# Patient Record
Sex: MALE | Race: WHITE | Employment: FULL TIME | URBAN - METROPOLITAN AREA
[De-identification: names, ages, dates, MRNs, and addresses within clinical notes are randomized per-mention and may not be internally consistent; named-entity substitution may affect disease eponyms.]

---

## 2018-09-02 ENCOUNTER — APPOINTMENT (OUTPATIENT)
Dept: CT IMAGING | Age: 28
End: 2018-09-02
Payer: COMMERCIAL

## 2018-09-02 ENCOUNTER — HOSPITAL ENCOUNTER (OUTPATIENT)
Age: 28
Setting detail: OBSERVATION
Discharge: HOME | End: 2018-09-05
Attending: EMERGENCY MEDICINE | Admitting: INTERNAL MEDICINE
Payer: COMMERCIAL

## 2018-09-02 DIAGNOSIS — R20.0 LEFT SIDED NUMBNESS: Primary | ICD-10-CM

## 2018-09-02 LAB
A/G RATIO: 1.5 (ref 1.1–2.2)
ALBUMIN SERPL-MCNC: 4.5 G/DL (ref 3.4–5)
ALP BLD-CCNC: 97 U/L (ref 40–129)
ALT SERPL-CCNC: 55 U/L (ref 10–40)
ANION GAP SERPL CALCULATED.3IONS-SCNC: 16 MMOL/L (ref 3–16)
AST SERPL-CCNC: 44 U/L (ref 15–37)
BASOPHILS ABSOLUTE: 0.1 K/UL (ref 0–0.2)
BASOPHILS RELATIVE PERCENT: 0.7 %
BILIRUB SERPL-MCNC: 0.3 MG/DL (ref 0–1)
BUN BLDV-MCNC: 14 MG/DL (ref 7–20)
CALCIUM SERPL-MCNC: 9.6 MG/DL (ref 8.3–10.6)
CHLORIDE BLD-SCNC: 101 MMOL/L (ref 99–110)
CO2: 23 MMOL/L (ref 21–32)
CREAT SERPL-MCNC: 0.8 MG/DL (ref 0.9–1.3)
EOSINOPHILS ABSOLUTE: 0.7 K/UL (ref 0–0.6)
EOSINOPHILS RELATIVE PERCENT: 4.9 %
GFR AFRICAN AMERICAN: >60
GFR NON-AFRICAN AMERICAN: >60
GLOBULIN: 3.1 G/DL
GLUCOSE BLD-MCNC: 108 MG/DL
GLUCOSE BLD-MCNC: 109 MG/DL (ref 70–99)
HCT VFR BLD CALC: 43.8 % (ref 40.5–52.5)
HEMOGLOBIN: 15.3 G/DL (ref 13.5–17.5)
LYMPHOCYTES ABSOLUTE: 3.4 K/UL (ref 1–5.1)
LYMPHOCYTES RELATIVE PERCENT: 24.1 %
MCH RBC QN AUTO: 30.5 PG (ref 26–34)
MCHC RBC AUTO-ENTMCNC: 34.8 G/DL (ref 31–36)
MCV RBC AUTO: 87.6 FL (ref 80–100)
MONOCYTES ABSOLUTE: 1.2 K/UL (ref 0–1.3)
MONOCYTES RELATIVE PERCENT: 8.4 %
NEUTROPHILS ABSOLUTE: 8.8 K/UL (ref 1.7–7.7)
NEUTROPHILS RELATIVE PERCENT: 61.9 %
PDW BLD-RTO: 13.5 % (ref 12.4–15.4)
PLATELET # BLD: 261 K/UL (ref 135–450)
PMV BLD AUTO: 8.2 FL (ref 5–10.5)
POTASSIUM REFLEX MAGNESIUM: 3.8 MMOL/L (ref 3.5–5.1)
RBC # BLD: 5 M/UL (ref 4.2–5.9)
SODIUM BLD-SCNC: 140 MMOL/L (ref 136–145)
TOTAL PROTEIN: 7.6 G/DL (ref 6.4–8.2)
WBC # BLD: 14.3 K/UL (ref 4–11)

## 2018-09-02 PROCEDURE — 99285 EMERGENCY DEPT VISIT HI MDM: CPT

## 2018-09-02 PROCEDURE — 93005 ELECTROCARDIOGRAM TRACING: CPT | Performed by: EMERGENCY MEDICINE

## 2018-09-02 PROCEDURE — 85025 COMPLETE CBC W/AUTO DIFF WBC: CPT

## 2018-09-02 PROCEDURE — 80053 COMPREHEN METABOLIC PANEL: CPT

## 2018-09-02 PROCEDURE — 70450 CT HEAD/BRAIN W/O DYE: CPT

## 2018-09-02 ASSESSMENT — PAIN SCALES - GENERAL: PAINLEVEL_OUTOF10: 4

## 2018-09-02 ASSESSMENT — PAIN DESCRIPTION - LOCATION: LOCATION: HEAD

## 2018-09-02 ASSESSMENT — PAIN DESCRIPTION - PAIN TYPE: TYPE: ACUTE PAIN

## 2018-09-03 ENCOUNTER — APPOINTMENT (OUTPATIENT)
Dept: MRI IMAGING | Age: 28
End: 2018-09-03
Payer: COMMERCIAL

## 2018-09-03 ENCOUNTER — APPOINTMENT (OUTPATIENT)
Dept: CT IMAGING | Age: 28
End: 2018-09-03
Payer: COMMERCIAL

## 2018-09-03 PROBLEM — R20.0 LEFT SIDED NUMBNESS: Status: ACTIVE | Noted: 2018-09-03

## 2018-09-03 LAB
AMPHETAMINE SCREEN, URINE: NORMAL
BARBITURATE SCREEN URINE: NORMAL
BENZODIAZEPINE SCREEN, URINE: NORMAL
BILIRUBIN URINE: NEGATIVE
BLOOD, URINE: NEGATIVE
CANNABINOID SCREEN URINE: NORMAL
CLARITY: CLEAR
COCAINE METABOLITE SCREEN URINE: NORMAL
COLOR: YELLOW
D DIMER: 303 NG/ML DDU (ref 0–229)
GLUCOSE URINE: NEGATIVE MG/DL
HCT VFR BLD CALC: 42.3 % (ref 40.5–52.5)
HEMOGLOBIN: 14.6 G/DL (ref 13.5–17.5)
KETONES, URINE: NEGATIVE MG/DL
LEUKOCYTE ESTERASE, URINE: NEGATIVE
Lab: NORMAL
MCH RBC QN AUTO: 30.2 PG (ref 26–34)
MCHC RBC AUTO-ENTMCNC: 34.5 G/DL (ref 31–36)
MCV RBC AUTO: 87.6 FL (ref 80–100)
METHADONE SCREEN, URINE: NORMAL
MICROSCOPIC EXAMINATION: NORMAL
NITRITE, URINE: NEGATIVE
OCCULT BLOOD DIAGNOSTIC: ABNORMAL
OPIATE SCREEN URINE: NORMAL
OXYCODONE URINE: NORMAL
PDW BLD-RTO: 13.5 % (ref 12.4–15.4)
PH UA: 7
PH UA: 7
PHENCYCLIDINE SCREEN URINE: NORMAL
PLATELET # BLD: 215 K/UL (ref 135–450)
PMV BLD AUTO: 8.1 FL (ref 5–10.5)
PROPOXYPHENE SCREEN: NORMAL
PROTEIN UA: NEGATIVE MG/DL
RBC # BLD: 4.83 M/UL (ref 4.2–5.9)
SEDIMENTATION RATE, ERYTHROCYTE: 7 MM/HR (ref 0–15)
SPECIFIC GRAVITY UA: 1.02
SPECIMEN STATUS: NORMAL
TROPONIN: <0.01 NG/ML
URINE TYPE: NORMAL
UROBILINOGEN, URINE: 0.2 E.U./DL
WBC # BLD: 9 K/UL (ref 4–11)

## 2018-09-03 PROCEDURE — 82746 ASSAY OF FOLIC ACID SERUM: CPT

## 2018-09-03 PROCEDURE — 99220 PR INITIAL OBSERVATION CARE/DAY 70 MINUTES: CPT | Performed by: PSYCHIATRY & NEUROLOGY

## 2018-09-03 PROCEDURE — 81240 F2 GENE: CPT

## 2018-09-03 PROCEDURE — 2580000003 HC RX 258: Performed by: INTERNAL MEDICINE

## 2018-09-03 PROCEDURE — 85379 FIBRIN DEGRADATION QUANT: CPT

## 2018-09-03 PROCEDURE — 96372 THER/PROPH/DIAG INJ SC/IM: CPT

## 2018-09-03 PROCEDURE — 85027 COMPLETE CBC AUTOMATED: CPT

## 2018-09-03 PROCEDURE — 93010 ELECTROCARDIOGRAM REPORT: CPT | Performed by: INTERNAL MEDICINE

## 2018-09-03 PROCEDURE — G0378 HOSPITAL OBSERVATION PER HR: HCPCS

## 2018-09-03 PROCEDURE — 80307 DRUG TEST PRSMV CHEM ANLYZR: CPT

## 2018-09-03 PROCEDURE — 86255 FLUORESCENT ANTIBODY SCREEN: CPT

## 2018-09-03 PROCEDURE — 85302 CLOT INHIBIT PROT C ANTIGEN: CPT

## 2018-09-03 PROCEDURE — G0328 FECAL BLOOD SCRN IMMUNOASSAY: HCPCS

## 2018-09-03 PROCEDURE — 96374 THER/PROPH/DIAG INJ IV PUSH: CPT

## 2018-09-03 PROCEDURE — 85652 RBC SED RATE AUTOMATED: CPT

## 2018-09-03 PROCEDURE — 83090 ASSAY OF HOMOCYSTEINE: CPT

## 2018-09-03 PROCEDURE — 70553 MRI BRAIN STEM W/O & W/DYE: CPT

## 2018-09-03 PROCEDURE — 6360000004 HC RX CONTRAST MEDICATION: Performed by: PSYCHIATRY & NEUROLOGY

## 2018-09-03 PROCEDURE — 85730 THROMBOPLASTIN TIME PARTIAL: CPT

## 2018-09-03 PROCEDURE — 6360000002 HC RX W HCPCS: Performed by: INTERNAL MEDICINE

## 2018-09-03 PROCEDURE — 83036 HEMOGLOBIN GLYCOSYLATED A1C: CPT

## 2018-09-03 PROCEDURE — 85305 CLOT INHIBIT PROT S TOTAL: CPT

## 2018-09-03 PROCEDURE — 85613 RUSSELL VIPER VENOM DILUTED: CPT

## 2018-09-03 PROCEDURE — 6370000000 HC RX 637 (ALT 250 FOR IP): Performed by: PSYCHIATRY & NEUROLOGY

## 2018-09-03 PROCEDURE — 86147 CARDIOLIPIN ANTIBODY EA IG: CPT

## 2018-09-03 PROCEDURE — 70496 CT ANGIOGRAPHY HEAD: CPT

## 2018-09-03 PROCEDURE — 81003 URINALYSIS AUTO W/O SCOPE: CPT

## 2018-09-03 PROCEDURE — 70498 CT ANGIOGRAPHY NECK: CPT

## 2018-09-03 PROCEDURE — 84484 ASSAY OF TROPONIN QUANT: CPT

## 2018-09-03 PROCEDURE — 6370000000 HC RX 637 (ALT 250 FOR IP): Performed by: INTERNAL MEDICINE

## 2018-09-03 PROCEDURE — 80061 LIPID PANEL: CPT

## 2018-09-03 PROCEDURE — 94761 N-INVAS EAR/PLS OXIMETRY MLT: CPT

## 2018-09-03 PROCEDURE — 36415 COLL VENOUS BLD VENIPUNCTURE: CPT

## 2018-09-03 PROCEDURE — A9579 GAD-BASE MR CONTRAST NOS,1ML: HCPCS | Performed by: INTERNAL MEDICINE

## 2018-09-03 PROCEDURE — 86038 ANTINUCLEAR ANTIBODIES: CPT

## 2018-09-03 PROCEDURE — 84443 ASSAY THYROID STIM HORMONE: CPT

## 2018-09-03 PROCEDURE — 82607 VITAMIN B-12: CPT

## 2018-09-03 PROCEDURE — 6360000004 HC RX CONTRAST MEDICATION: Performed by: INTERNAL MEDICINE

## 2018-09-03 PROCEDURE — 85610 PROTHROMBIN TIME: CPT

## 2018-09-03 RX ORDER — HYDRALAZINE HYDROCHLORIDE 10 MG/1
10 TABLET, FILM COATED ORAL EVERY 6 HOURS PRN
Status: DISCONTINUED | OUTPATIENT
Start: 2018-09-03 | End: 2018-09-04

## 2018-09-03 RX ORDER — ASPIRIN 81 MG/1
81 TABLET, CHEWABLE ORAL DAILY
Status: DISCONTINUED | OUTPATIENT
Start: 2018-09-03 | End: 2018-09-05 | Stop reason: HOSPADM

## 2018-09-03 RX ORDER — SODIUM CHLORIDE 0.9 % (FLUSH) 0.9 %
10 SYRINGE (ML) INJECTION PRN
Status: DISCONTINUED | OUTPATIENT
Start: 2018-09-03 | End: 2018-09-05 | Stop reason: HOSPADM

## 2018-09-03 RX ORDER — SODIUM CHLORIDE 0.9 % (FLUSH) 0.9 %
10 SYRINGE (ML) INJECTION EVERY 12 HOURS SCHEDULED
Status: DISCONTINUED | OUTPATIENT
Start: 2018-09-03 | End: 2018-09-05 | Stop reason: HOSPADM

## 2018-09-03 RX ORDER — ONDANSETRON 2 MG/ML
4 INJECTION INTRAMUSCULAR; INTRAVENOUS EVERY 6 HOURS PRN
Status: DISCONTINUED | OUTPATIENT
Start: 2018-09-03 | End: 2018-09-05 | Stop reason: HOSPADM

## 2018-09-03 RX ADMIN — ONDANSETRON 4 MG: 2 INJECTION INTRAMUSCULAR; INTRAVENOUS at 11:05

## 2018-09-03 RX ADMIN — HYDRALAZINE HYDROCHLORIDE 10 MG: 10 TABLET, FILM COATED ORAL at 08:02

## 2018-09-03 RX ADMIN — ENOXAPARIN SODIUM 40 MG: 40 INJECTION SUBCUTANEOUS at 08:03

## 2018-09-03 RX ADMIN — ASPIRIN 81 MG 81 MG: 81 TABLET ORAL at 14:43

## 2018-09-03 RX ADMIN — IOPAMIDOL 80 ML: 755 INJECTION, SOLUTION INTRAVENOUS at 14:12

## 2018-09-03 RX ADMIN — Medication 10 ML: at 19:52

## 2018-09-03 RX ADMIN — SODIUM CHLORIDE, PRESERVATIVE FREE 10 ML: 5 INJECTION INTRAVENOUS at 11:05

## 2018-09-03 RX ADMIN — HYDRALAZINE HYDROCHLORIDE 10 MG: 10 TABLET, FILM COATED ORAL at 23:41

## 2018-09-03 RX ADMIN — Medication 10 ML: at 08:03

## 2018-09-03 RX ADMIN — GADOTERIDOL 30 ML: 279.3 INJECTION, SOLUTION INTRAVENOUS at 10:49

## 2018-09-03 ASSESSMENT — PAIN SCALES - GENERAL: PAINLEVEL_OUTOF10: 0

## 2018-09-03 NOTE — ED PROVIDER NOTES
Allergies    REVIEW OF SYSTEMS  10 systems reviewed, pertinent positives per HPI otherwise noted to be negative. PHYSICAL EXAM  BP (!) 151/95   Pulse 88   Temp 98.4 °F (36.9 °C) (Oral)   Resp 19   Ht 6' 1\" (1.854 m)   Wt (!) 370 lb (167.8 kg)   SpO2 98%   BMI 48.82 kg/m²   GENERAL APPEARANCE: Awake and alert. Cooperative. No acute distress. HEAD: Normocephalic. Atraumatic. EYES: PERRL. EOM's grossly intact. ENT: Mucous membranes are moist.   NECK: Supple. HEART: RRR. No murmurs. LUNGS: Respirations unlabored. CTAB. Good air exchange. Speaking comfortably in full sentences. ABDOMEN: Soft. Non-distended. Non-tender. No masses. No organomegaly. No guarding or rebound. EXTREMITIES: No peripheral edema. Moves all extremities equally. SKIN: Warm and dry. No acute rashes. NEUROLOGICAL: Alert and oriented. Please see NIH Stroke Scale below. PSYCHIATRIC: Normal mood and affect. NIH Stroke Scale     Interval: Baseline  Time: 0015  Person Administering Scale: Marvin DAVIS MD   Administer stroke scale items in the order listed. Record performance in each category after each subscale exam. Do not go back and change scores. Follow directions provided for each exam technique. Scores should reflect what the patient does, not what the clinician thinks the patient can do. The clinician should record answers while administering the exam and work quickly. Except where indicated, the patient should not be coached (i.e., repeated requests to patient to make a special effort). 1a  Level of consciousness: 0=alert; keenly responsive   1b. LOC questions:  0=Performs both tasks correctly   1c. LOC commands: 0=Performs both tasks correctly   2. Best Gaze: 0=normal   3. Visual: 0=No visual loss   4. Facial Palsy: 0=Normal symmetric movement   5a. Motor left arm: 0=No drift, limb holds 90 (or 45) degrees for full 10 seconds   5b.   Motor right arm: 0=No drift, limb holds 90 (or 45) degrees for full 10 seconds   6a. motor left le=No drift, limb holds 90 (or 45) degrees for full 10 seconds   6b  Motor right le=No drift, limb holds 90 (or 45) degrees for full 10 seconds   7. Limb Ataxia: 0=Absent   8. Sensory: 1=Mild to moderate sensory loss; patient feels pinprick is less sharp or is dull on the affected side; there is a loss of superficial pain with pinprick but patient is aware He is being touched   9. Best Language:  0=No aphasia, normal   10. Dysarthria: 0=Normal   11. Extinction and Inattention: 0=No abnormality   12. Distal motor function: 0=Normal    Total:  1     Modified Rome Score - Assessing Disability From Stroke    Score: 1 - No significant disability despite symptoms; able to carry out all usual duties and activities    LABS  I have reviewed all labs for this visit.    Results for orders placed or performed during the hospital encounter of 18   CBC auto differential   Result Value Ref Range    WBC 14.3 (H) 4.0 - 11.0 K/uL    RBC 5.00 4.20 - 5.90 M/uL    Hemoglobin 15.3 13.5 - 17.5 g/dL    Hematocrit 43.8 40.5 - 52.5 %    MCV 87.6 80.0 - 100.0 fL    MCH 30.5 26.0 - 34.0 pg    MCHC 34.8 31.0 - 36.0 g/dL    RDW 13.5 12.4 - 15.4 %    Platelets 135 271 - 758 K/uL    MPV 8.2 5.0 - 10.5 fL    Neutrophils % 61.9 %    Lymphocytes % 24.1 %    Monocytes % 8.4 %    Eosinophils % 4.9 %    Basophils % 0.7 %    Neutrophils # 8.8 (H) 1.7 - 7.7 K/uL    Lymphocytes # 3.4 1.0 - 5.1 K/uL    Monocytes # 1.2 0.0 - 1.3 K/uL    Eosinophils # 0.7 (H) 0.0 - 0.6 K/uL    Basophils # 0.1 0.0 - 0.2 K/uL   Comprehensive Metabolic Panel w/ Reflex to MG   Result Value Ref Range    Sodium 140 136 - 145 mmol/L    Potassium reflex Magnesium 3.8 3.5 - 5.1 mmol/L    Chloride 101 99 - 110 mmol/L    CO2 23 21 - 32 mmol/L    Anion Gap 16 3 - 16    Glucose 109 (H) 70 - 99 mg/dL    BUN 14 7 - 20 mg/dL    CREATININE 0.8 (L) 0.9 - 1.3 mg/dL    GFR Non-African American >60 >60    GFR African American >60 >60    Calcium 9.6 8.3 - 10.6 mg/dL    Total Protein 7.6 6.4 - 8.2 g/dL    Alb 4.5 3.4 - 5.0 g/dL    Albumin/Globulin Ratio 1.5 1.1 - 2.2    Total Bilirubin 0.3 0.0 - 1.0 mg/dL    Alkaline Phosphatase 97 40 - 129 U/L    ALT 55 (H) 10 - 40 U/L    AST 44 (H) 15 - 37 U/L    Globulin 3.1 g/dL   Sample possible blood bank testing   Result Value Ref Range    Specimen Status VALERIE    POCT glucose   Result Value Ref Range    Glucose 108 mg/dL       EKG Interpretation    Interpreted by emergency department physician    My interpretation: Normal sinus rhythm rate 94, left axis deviation, normal intervals, no ST elevations or depressions      RADIOLOGY    CT HEAD WO CONTRAST   Final Result   No acute intracranial abnormality. Paranasal sinus disease, greatest of the right ethmoid sinus. ED COURSE/MDM  Patient seen and evaluated. Old records reviewed. Labs and imaging reviewed and results discussed. Patient presented to the emergency department with left-sided facial numbness and arm and leg numbness. Patient has a stroke scale of 1. Patient is not experiencing a disabling stroke. Patient does not warrant TPA given the symptoms and low likelihood of associated long-term disability. Patient will need admission for MRI as well as further stroke risk factor reduction. Hospitalist service was contacted and accepted the patient for admission and continued management. We thoroughly discussed the history, physical exam, laboratory and imaging studies, as well as, emergency department course. Based upon that discussion, we've decided to admit Sintia Isaacs for further observation and evaluation of Alecia Hassan's CVA-like symptoms. As I have deemed necessary from their history, physical and studies, I have considered and evaluated Sintia Isaacs for the following diagnoses:  DIABETES, INTRACRANIAL HEMORRHAGE, MENINGITIS, SUBARACHNOID HEMORRHAGE, SUBDURAL HEMATOMA, & STROKE.     t-PA NOT given due to the following EXCLUSION CRITERIA (only those checked):  [] Pregnancy  [] Symptoms > 3 hours of onset  [x] Minor or isolated neurological signs  [] Rapid improvement of stroke symptoms  [] Seizure at the same time of stroke symptoms  [] Active bleeding or acute trauma (fracture)  [] Presentation consistent with acute MI or post-MI pericarditis  [] Known intracranial neoplasm, AV malformation or aneurysm  [] CT evidence of intracranial hemorrhage  [] Any prior history of intracranial hemorrhage  [] Symptoms suggestive of subarachnoid hemorrhage (even if head CT normal)  [] Persistent hypertension (SBP>185 or DBP>110)  [] Glucose < 50 or > 400. [] Bleeding diathesis, including but not limited to:   -Platelets < 117,297   -Heparin within 48 hours with PTT > normal range   -Current or recent use of anticoagulants (dabagtran, rivaroxaban, or warfarin with     INR > 1.7)  [] Lumbar puncture in past 7 days  [] Arterial puncture at a noncompressible site in past 7 days  [] Major surgery in past 14 days  [] Gastrointestinal or urinary tract hemorrhage in past 21 days  [] Myocardial infarction in past 3 months  [] Stroke, intracranial surgery or serious head trauma in past 3 months    t-PA given with the following INCLUSION CRITERIA verified (only those checked):  [] Age 25 years or older  [] Clinical diagnosis of ischemic stroke causing measurable neurological deficit  [] Administration of t-PA can be initiated within 3 hours of onset of symptoms  [] A patient or family members who understand the potential risks and benefits:   Of every 100 patients treated with tPA:   72 will have the same outcome   32 will have a better outcome   3 will have a worse outcome (with 1 being severely disabled or fatal) due to t-PA        CLINICAL IMPRESSION  1. Left sided numbness        Blood pressure (!) 151/95, pulse 88, temperature 98.4 °F (36.9 °C), temperature source Oral, resp.  rate 19, height 6' 1\" (1.854 m), weight (!) 370 lb (167.8 kg), SpO2 98

## 2018-09-03 NOTE — CONSULTS
In patient Neurology consult        [facility] Neurology      MD Sylvia Posada  1990    Date of Service: 9/3/2018    Referring Physician: Annemarie Gee MD      Reason for the consult: Left sided numbness. Possible stroke. HPI:   The patient is a 34y.o.  years old male without significant past medical history who was admitted to [facility] last night with acute left-sided numbness and tingling. Symptoms started yesterday afternoon. He describes sudden onset of left face, arm and leg numbness and tingling. No triggers or other associated symptoms. Degree was severe. Duration was waxing and waning but persistent. No double vision or blurred vision, dysphagia or dysarthria or falling. Patient had a similar episode few days ago and was seen at outside ED. Initial workup in the ED revealed unremarkable CT of the head. He was eventually admitted. Blood pressure was elevated in the ED above 170. Today he denies any residual deficit except for occasional numbness and tingling. He denies any headache double vision or blurred vision. No history of migraines. Patient is scheduled for MRI of the brain. No family history of strokes or TIA. Blood pressure continues to be elevated at 183/90. He denies any chest pain, neck or back pain, bowel or bladder issues. Other review of system was unremarkable. History reviewed. No pertinent past medical history. History reviewed. No pertinent family history. History reviewed. No pertinent surgical history. History reviewed. No pertinent surgical history. History reviewed. No pertinent family history.   Social History   Substance Use Topics    Smoking status: Former Smoker    Smokeless tobacco: Never Used    Alcohol use Yes      Comment: weekends      No Known Allergies  Current Facility-Administered Medications   Medication Dose Route Frequency Provider Last Rate Last Dose    sodium chloride flush 0.9 % injection 10 elevation is symmetric  XI: Shoulder shrug is intact  XII: Tongue movements are normal  Musculoskeletal: 5/5 in all 4 extremities. Normal tone. Reflexes: Bilateral biceps 2/4, triceps 2/4, brachial radialis 2/4, knee 2/4 and ankle 2/4. Planters: flexor bilaterally. Coordination: no pronator drift, no dysmetria. Finger nose finger testing within normal limits. Sensation: normal to all modalities. Gait/Posture: steady    Data:  LABS:   Lab Results   Component Value Date     09/02/2018    K 3.8 09/02/2018     09/02/2018    CO2 23 09/02/2018    BUN 14 09/02/2018    CREATININE 0.8 09/02/2018    GFRAA >60 09/02/2018    LABGLOM >60 09/02/2018    GLUCOSE 108 09/02/2018    CALCIUM 9.6 09/02/2018     Lab Results   Component Value Date    WBC 14.3 09/02/2018    RBC 5.00 09/02/2018    HGB 15.3 09/02/2018    HCT 43.8 09/02/2018    MCV 87.6 09/02/2018    RDW 13.5 09/02/2018     09/02/2018   No results found for: INR, PROTIME    Neuroimaging and/or  labs reviewed by me and discussed results with the patient    Impression:  Acute left-sided paresthesia. Possible hypertensive encephalopathy, TIA or CVA. Other possibility could include demyelination. Hypertension, new diagnoses  Obesity       Recommendation:  MRI brain  CTA head and neck  Echo  Telemetry  Blood pressure monitor and control. Goal is 160/90  Start aspirin daily  Consider Statin   Check A1c, lipid panel and TSH  Check inflammatory markers in the serum  PT and OT  DVT and GI prophylaxis  BS monitor   Considered outpatient sleep study for possible sleep apnea  Secondary stroke prevention was discussed with the patient  Will follow       Thank you for referring such patient. If you have any questions regarding my consult note, please don't hesitate to call me. Alexy Pérez MD  [phone]    This dictation was generated by voice recognition computer software.  Although all attempts are made to edit the dictation for accuracy, there may be errors in the  transcription that are not intended

## 2018-09-03 NOTE — H&P
Hospital Medicine History & Physical      PCP: Fran Gilbert MD    Date of Admission: 9/2/2018    Date of Service: Pt seen/examined on  9/3/2008 and Placed in Observation. Chief Complaint:  Left-sided weakness      History Of Present Illness:      34 y.o. male had right upper molar pulled out on Wednesday. On Thursday he felt a little feverish and felt weak overall while at work. He was taken to [facility] for left arm and leg weakness. Blood work and EKG done which were normal. He was sent home. Since then he has couple of episodes of left arm and leg weakness. Yesterday again he felt worsening of the weakness on the left side along with left facial tingling. No speech problems. Came in to get evaluated    He states he has been having on and off chest pain going to his left arm at times. Now he states he noticed  blood in the bowel movement which is new. He sees his left eye is blurry at times starting this morning    No family history of strokes. History of brain tumor in maternal uncle. No history of DVTs in the family    Past Medical History:      History reviewed. No pertinent past medical history. Past Surgical History:      History reviewed. No pertinent surgical history. Medications Prior to Admission:      Prior to Admission medications    Not on File       Allergies:  Patient has no known allergies. Social History:      TOBACCO:   reports that he has quit smoking. He has never used smokeless tobacco.  ETOH:   reports that he drinks alcohol. Family History:       Reviewed in detail and negative for DM, CAD, Cancer, CVA. Positive as follows:    History reviewed. No pertinent family history. REVIEW OF SYSTEMS:   Pertinent positives as noted in the HPI. All other systems reviewed and negative.     PHYSICAL EXAM PERFORMED:    BP (!) 145/92   Pulse 91   Temp 97.5 °F (36.4 °C) (Oral)   Resp 16   Ht 6' 1\" (1.854 m)   Wt (!) 369 lb 8 oz (167.6 kg)   SpO2 97% BMI 48.75 kg/m²     General appearance:  No apparent distress, appears stated age and cooperative. HEENT:  Normal cephalic, atraumatic without obvious deformity. Pupils equal, round, and reactive to light. Extra ocular muscles intact. Conjunctivae/corneas clear. Neck: Supple, with full range of motion. No jugular venous distention. Trachea midline. Respiratory:  Normal respiratory effort. Clear to auscultation, bilaterally without Rales/Wheezes/Rhonchi. Cardiovascular:  Regular rate and rhythm with normal S1/S2 without murmurs, rubs or gallops. Abdomen: Soft, non-tender, non-distended with normal bowel sounds. Musculoskeletal:  No clubbing, cyanosis or edema bilaterally. Full range of motion without deformity. Skin: Skin color, texture, turgor normal.  No rashes or lesions. Neurologic:  No facial droop or pronator drift. Reflexes are symmetrical bilaterally. Left upper arm and lower arm strength-4/5. Paresthesias noted in the left upper extremity as well as lower extremity too  Psychiatric:  Alert and oriented, thought content appropriate, normal insight  Capillary Refill: Brisk,< 3 seconds   Peripheral Pulses: +2 palpable, equal bilaterally       Labs:     Recent Labs      09/02/18   2150   WBC  14.3*   HGB  15.3   HCT  43.8   PLT  261     Recent Labs      09/02/18   2150   NA  140   K  3.8   CL  101   CO2  23   BUN  14   CREATININE  0.8*   CALCIUM  9.6     Recent Labs      09/02/18   2150   AST  44*   ALT  55*   BILITOT  0.3   ALKPHOS  97     No results for input(s): INR in the last 72 hours. No results for input(s): [place] in the last 72 hours. Urinalysis:    No results found for: To Jeyson, BACTERIA, [address] [facility], [facility], [address], [facility] [address]    Radiology:       EKG:  I have reviewed the EKG with the following interpretation: Normal sinus rhythm no acute ST-T changes     CT HEAD WO CONTRAST   Final Result   No acute intracranial abnormality.       Paranasal sinus disease, greatest of the right ethmoid sinus. MRI Brain W WO Contrast    (Results Pending)       ASSESSMENT:    Active Hospital Problems    Diagnosis Date Noted    Left sided numbness [R20.0] 09/03/2018         PLAN:    Left-sided numbness/weakness : CT had an MRI brain negative. ? Demyelinating disease . ? vascullitis. Neurology consulted. Neuro checks. CTA head and neck ordered. ? Need for lumbar puncture . Sedimentation rate pending . Check yuval,anca. Hypercoagulable workup ordered    ? Hematochezia: Repeat CBC. Order Hemoccult. ? Chest pain: Brief, one episode. EKG nonacute. Check troponin    Morbid obesity: BMI 48      - Multiple complaints relevant to multiple organ systems. Await work up    DVT Prophylaxis: SCDs  Diet: DIET LOW SODIUM 2 GM;  Code Status: Full Code    PT/OT Eval Status: order    Dispo - medical floor/telemetry       Acosta Gamez MD    Thank you Annmarie Torres MD for the opportunity to be involved in this patient's care. If you have any questions or concerns please feel free to contact me at [phone].

## 2018-09-03 NOTE — ED PROVIDER NOTES
Physician Triage Note        Location: [facility]  ED  9/2/2018      I evaluated Adrianna Karimi as the triage physician to initiate their ED workup in an expeditious manner. Please see notes from other ED providers regarding comprehensive evaluation including full history, physical exam, interpretation of results, and medical decision making/disposition      Briefly, this is a 34 y.o. male here for left sided numbness that started 2 hours ago and a headache 3/10 in intensity that started half an hour ago. Denies vision changes. Denies prior history of headache. Patient states he had similar episode 2 days ago that he went to [facility] and got evaluated there. He said he did not get a head CT but got labs and EKG. Triage exam showed awake, alert male  No slurred speech  No facial droop  Unlabored breathing  Speak in full sentences  Sensation equal bilaterally (face, arms, torso, legs)    - patient states it's more of tingling sensation. He can feel all the light touch and they feel the same bilaterally  Strength equal bilaterally. No visual field loss  Normal muscle tone. Normal coordination. Baseline NIHSS 0    Tests ordered: poc glucose  CBC, CMP, Trop, EKG, CXR and Head CT      I did not personally review any of the results of these tests, which will be reviewed and interpreted later by ED providers at the time of the patient's more comprehensive evaluation.          Blanca Wright MD  09/03/18 [id number]

## 2018-09-03 NOTE — PROGRESS NOTES
Pt complaining of numbness and tingling on his left side states \"it feels like pins and needles from my left shoulder down to my hand\", chest pressure, and cramping in his legs. He also states that his lightheadedness is coming back. His BP this AM was 171/112. Perfect serve message sent to MD for the above. MD ordered hydralazine 10mg Q6h PRN for SBP >170 and DBP> 100. Hydralazine given at 0802.

## 2018-09-03 NOTE — ED NOTES
Patient is unable to tandem walk without nearly falling, patient is feeling nausea. Will notify provider.       Fausto Jean RN  09/03/18 [id number]

## 2018-09-04 LAB
CHOLESTEROL, FASTING: 174 MG/DL (ref 0–199)
ESTIMATED AVERAGE GLUCOSE: 99.7 MG/DL
FOLATE: 9.26 NG/ML (ref 4.78–24.2)
HBA1C MFR BLD: 5.1 %
HDLC SERPL-MCNC: 51 MG/DL (ref 40–60)
HOMOCYSTEINE: 6 UMOL/L (ref 0–10)
LDL CHOLESTEROL CALCULATED: 89 MG/DL
TRIGLYCERIDE, FASTING: 172 MG/DL (ref 0–150)
TSH REFLEX: 2.36 UIU/ML (ref 0.27–4.2)
VITAMIN B-12: 616 PG/ML (ref 211–911)
VLDLC SERPL CALC-MCNC: 34 MG/DL

## 2018-09-04 PROCEDURE — 96376 TX/PRO/DX INJ SAME DRUG ADON: CPT

## 2018-09-04 PROCEDURE — G8978 MOBILITY CURRENT STATUS: HCPCS

## 2018-09-04 PROCEDURE — G8987 SELF CARE CURRENT STATUS: HCPCS

## 2018-09-04 PROCEDURE — 2580000003 HC RX 258: Performed by: INTERNAL MEDICINE

## 2018-09-04 PROCEDURE — 6370000000 HC RX 637 (ALT 250 FOR IP): Performed by: INTERNAL MEDICINE

## 2018-09-04 PROCEDURE — G8988 SELF CARE GOAL STATUS: HCPCS

## 2018-09-04 PROCEDURE — 6370000000 HC RX 637 (ALT 250 FOR IP): Performed by: PSYCHIATRY & NEUROLOGY

## 2018-09-04 PROCEDURE — 6360000002 HC RX W HCPCS: Performed by: INTERNAL MEDICINE

## 2018-09-04 PROCEDURE — G8989 SELF CARE D/C STATUS: HCPCS

## 2018-09-04 PROCEDURE — G8979 MOBILITY GOAL STATUS: HCPCS

## 2018-09-04 PROCEDURE — G8980 MOBILITY D/C STATUS: HCPCS

## 2018-09-04 PROCEDURE — 97116 GAIT TRAINING THERAPY: CPT

## 2018-09-04 PROCEDURE — 6370000000 HC RX 637 (ALT 250 FOR IP): Performed by: NURSE PRACTITIONER

## 2018-09-04 PROCEDURE — C9113 INJ PANTOPRAZOLE SODIUM, VIA: HCPCS | Performed by: INTERNAL MEDICINE

## 2018-09-04 PROCEDURE — 97165 OT EVAL LOW COMPLEX 30 MIN: CPT

## 2018-09-04 PROCEDURE — 97530 THERAPEUTIC ACTIVITIES: CPT

## 2018-09-04 PROCEDURE — G0378 HOSPITAL OBSERVATION PER HR: HCPCS

## 2018-09-04 PROCEDURE — 96375 TX/PRO/DX INJ NEW DRUG ADDON: CPT

## 2018-09-04 PROCEDURE — 97162 PT EVAL MOD COMPLEX 30 MIN: CPT

## 2018-09-04 PROCEDURE — 99225 PR SBSQ OBSERVATION CARE/DAY 25 MINUTES: CPT | Performed by: PSYCHIATRY & NEUROLOGY

## 2018-09-04 RX ORDER — PANTOPRAZOLE SODIUM 40 MG/10ML
40 INJECTION, POWDER, LYOPHILIZED, FOR SOLUTION INTRAVENOUS 2 TIMES DAILY
Status: DISCONTINUED | OUTPATIENT
Start: 2018-09-04 | End: 2018-09-05 | Stop reason: HOSPADM

## 2018-09-04 RX ORDER — HYDRALAZINE HYDROCHLORIDE 10 MG/1
10 TABLET, FILM COATED ORAL EVERY 6 HOURS PRN
Status: DISCONTINUED | OUTPATIENT
Start: 2018-09-04 | End: 2018-09-05 | Stop reason: HOSPADM

## 2018-09-04 RX ORDER — BUTALBITAL, ACETAMINOPHEN AND CAFFEINE 50; 325; 40 MG/1; MG/1; MG/1
1 TABLET ORAL EVERY 4 HOURS PRN
Status: DISCONTINUED | OUTPATIENT
Start: 2018-09-04 | End: 2018-09-05 | Stop reason: HOSPADM

## 2018-09-04 RX ADMIN — Medication 10 ML: at 21:21

## 2018-09-04 RX ADMIN — PANTOPRAZOLE SODIUM 40 MG: 40 INJECTION, POWDER, FOR SOLUTION INTRAVENOUS at 12:39

## 2018-09-04 RX ADMIN — ASPIRIN 81 MG 81 MG: 81 TABLET ORAL at 08:42

## 2018-09-04 RX ADMIN — Medication 10 ML: at 08:42

## 2018-09-04 RX ADMIN — HYDRALAZINE HYDROCHLORIDE 10 MG: 10 TABLET, FILM COATED ORAL at 15:25

## 2018-09-04 RX ADMIN — PANTOPRAZOLE SODIUM 40 MG: 40 INJECTION, POWDER, FOR SOLUTION INTRAVENOUS at 21:21

## 2018-09-04 RX ADMIN — BUTALBITAL, ACETAMINOPHEN, AND CAFFEINE 1 TABLET: 50; 325; 40 TABLET ORAL at 00:54

## 2018-09-04 ASSESSMENT — PAIN DESCRIPTION - FREQUENCY: FREQUENCY: INTERMITTENT

## 2018-09-04 ASSESSMENT — PAIN DESCRIPTION - DESCRIPTORS
DESCRIPTORS: TINGLING;NUMBNESS;ACHING
DESCRIPTORS: DULL;SHARP

## 2018-09-04 ASSESSMENT — PAIN DESCRIPTION - LOCATION
LOCATION: HEAD;ARM;SHOULDER
LOCATION: HEAD;SHOULDER;ARM
LOCATION: CHEST

## 2018-09-04 ASSESSMENT — PAIN DESCRIPTION - ORIENTATION: ORIENTATION: MID

## 2018-09-04 ASSESSMENT — PAIN DESCRIPTION - PAIN TYPE: TYPE: ACUTE PAIN

## 2018-09-04 ASSESSMENT — PAIN SCALES - GENERAL
PAINLEVEL_OUTOF10: 2
PAINLEVEL_OUTOF10: 5
PAINLEVEL_OUTOF10: 4
PAINLEVEL_OUTOF10: 2

## 2018-09-04 NOTE — CONSULTS
intravenous contrast. COMPARISON: None. HISTORY: ORDERING SYSTEM PROVIDED HISTORY: TIA TECHNOLOGIST PROVIDED HISTORY: Ordering Physician Provided Reason for Exam: patient claims stroke symptoms especially on left side, pain in neck and base of head Acuity: Acute Type of Exam: Initial FINDINGS: INTRACRANIAL STRUCTURES/VENTRICLES:  There is no acute infarct. No mass effect or midline shift. No evidence of an acute intracranial hemorrhage. The ventricles and sulci are normal in size and configuration. The sellar/suprasellar regions appear unremarkable. The normal signal voids within the major intracranial vessels appear maintained. No abnormal focus of enhancement is seen within the brain. ORBITS: The visualized portion of the orbits demonstrate no acute abnormality. SINUSES: Mucous retention cysts within the maxillary antra bilaterally with scattered mucosal thickening of the paranasal sinuses. There is minimal opacification the right mastoid air cells. BONES/SOFT TISSUES: The bone marrow signal intensity appears normal. The soft tissues demonstrate no acute abnormality. 1. No acute intracranial abnormality. No acute infarct. 2. Scattered sinusitis. IMPRESSION/PLAN: 33 y/o man with waxing/waning left hemibody numbness/weakness. 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 No radiographic evidence of SAH. History is not suggestive of such. Unclear of the etiology his current symptoms although I think the aneurysm is not likely playing a role. May be more cardiac/central in origin with elevated BP and worsening symtoms with exertion in the absence of any cervical/brain vascular stenosis. Will have him follow up with one of our cerebro-vascular surgeons as an outpatient. Defer further w/u for presenting complaints to neurology/primary team.  Will sign off, please call with any questions or concerns. Thank you again for this consultation.     Edison Harris

## 2018-09-04 NOTE — PROGRESS NOTES
Pt states numbness and tingling has resolved and pain as reduced. Charted in flow sheets under Q4 assessment. Will monitor.

## 2018-09-04 NOTE — PROGRESS NOTES
Independent  Active : Yes  Occupation: Full time employment  Type of occupation: [employer] - on feet all day  Leisure & Hobbies: hiking, movies     Objective  Observation/Palpation  Posture: Good    AROM RLE (degrees)  RLE AROM: WFL  AROM LLE (degrees)  LLE AROM : WFL  Strength RLE  Comment: Grossly 4+/5 throughout  Strength LLE  Comment: Grossly 4+/5 throughout  Motor Control  Gross Motor?: WFL (shin to heel slide, alternating toe taps)  Sensation  Overall Sensation Status: WNL (Light touch to BLE dermatomes )     Bed mobility  Supine to Sit: Supervision (HOB elevated slightly)  Sit to Supine: Unable to assess (Pt up in chair to end session)  Scooting: Supervision     Transfers  Sit to Stand: Independent  Stand to sit: Modified independent (use of armrests)     Ambulation  WB Status: FWB  Surface: level tile  Device: No Device  Assistance: Supervision  Quality of Gait: Normal rosaura, reciprocal step-through pattern with no indications of fatigue, strength/tone deficit, or LOB. Increased lateral sway likely consistent with baseline as a function of body habitus. Distance: 250'  Comments: Pt reports mild tingling through LLE, consistent with symptoms since admission. Pt also reports mild cramping in R quad and states he believes it is a function of recent inactivity since hospital admission. Therapist encouraged continued fluid consumption and frequent LE movement while in bed/chair. Stairs/Curb  Stairs?: No    Balance  Posture: Good  Sitting - Static: Good  Sitting - Dynamic: Good  Standing - Static: Good  Standing - Dynamic: Good    Assessment   Assessment: Pt referred for PT evaluation following hospital admission with recent onset of left sided weakness and N/T. Pt is able to perform all aspects of bed mobility and functional transfers without physical assistance and demonstrated safe ambulation of 250' without AD and therapist providing supervision.   Stairs assessment not completed but no deficits expected based on other demonstrated aspects of functional mobility. Pt appears at baseline for gross functional mobility despite unresolved complaints of intermittent N/T throughout left side (back of head, through neck/shldr, arm and legs). Recommend D/C from PT in acute setting; He may benefit from outpatient PT if desired to address occupational or recreational activity limitations if symptoms persist. Pt is young/active with high-demand physical labor job.   Prognosis: Good  Decision Making: Medium Complexity  No Skilled PT: Safe to return home  REQUIRES PT FOLLOW UP: No  Activity Tolerance: Patient Tolerated treatment well       Plan   Plan  Times per week: D/C from PT services in acute care  Safety Devices  Type of devices: Left in chair, Nurse notified, Gait belt    G-Code  PT G-Codes  Functional Assessment Tool Used: AM-PAC  Score: 24  Functional Limitation: Mobility: Walking and moving around  Mobility: Walking and Moving Around Current Status (): 0 percent impaired, limited or restricted  Mobility: Walking and Moving Around Goal Status (): 0 percent impaired, limited or restricted  Mobility: Walking and Moving Around Discharge Status (): 0 percent impaired, limited or restricted    AM-PAC Score  AM-PAC Inpatient Mobility Raw Score : 24  AM-PAC Inpatient T-Scale Score : 61.14  Mobility Inpatient CMS 0-100% Score: 0  Mobility Inpatient CMS G-Code Modifier : [address]    Goals  Short term goals  Time Frame for Short term goals: 1 visit  Short term goal 1: Pt will ambulate 150' without AD and therapist supervision - MET 9/4/2018  Patient Goals   Patient goals : \"to return home\"       Therapy Time   Individual Concurrent Group Co-treatment   Time In [phone]         Time Out 0855         Minutes 20         Timed Code Treatment Minutes: 10 Minutes     VIPUL Gray  Supervised by Rachid Gtz, PT, DPT (present in room to supervise/direct all treatment)

## 2018-09-04 NOTE — PROGRESS NOTES
Jerry Sosa  Neurology Follow-up  [facility] Neurology    Date of Service: 9/4/2018    Subjective:   CC: Follow up today regarding: left-sided paresthesia    Events noted. Chart and lab reviewed. The patient had his MRI of the brain which showed no acute findings. CTA of the head and neck showed incidental small left ICA aneurysm. He had 1 or 2 episodes of left-sided paresthesia for few minutes. No severe headache, chest pain, dysphagia or dysarthria. No significant neck or back pain. Patient had positive occult stool. No hx of GIB. No other new symptoms today. Other review of system was unremarkable. ROS : A 10-12 system review of constitutional, cardiovascular, respiratory, musculoskeletal, endocrine, skin, SHEENT, genitourinary, psychiatric and neurologic systems was obtained and updated today and is unremarkable except as mentioned  in my interval history. History reviewed. No pertinent past medical history.   Current Facility-Administered Medications   Medication Dose Route Frequency Provider Last Rate Last Dose    butalbital-acetaminophen-caffeine (FIORICET, ESGIC) per tablet 1 tablet  1 tablet Oral Q4H PRN Paralee Lukes, APRN - CNP   1 tablet at 09/04/18 0054    sodium chloride flush 0.9 % injection 10 mL  10 mL Intravenous 2 times per day Rachid Reynolds MD   10 mL at 09/04/18 0842    sodium chloride flush 0.9 % injection 10 mL  10 mL Intravenous PRN Rachid Reynolds MD   10 mL at 09/03/18 1105    magnesium hydroxide (MILK OF MAGNESIA) 400 MG/5ML suspension 30 mL  30 mL Oral Daily PRN Rachid Reynolds MD        ondansetron [facility]) injection 4 mg  4 mg Intravenous Q6H PRN Rachid Reynolds MD   4 mg at 09/03/18 1105    hydrALAZINE (APRESOLINE) tablet 10 mg  10 mg Oral Q6H PRN Tin Mcclellan MD   10 mg at 09/03/18 2341    aspirin chewable tablet 81 mg  81 mg Oral Daily David Penny MD   81 mg at 09/04/18 0842     No Known Allergies  family history is

## 2018-09-04 NOTE — PLAN OF CARE
Problem: Falls - Risk of:  Goal: Will remain free from falls  Will remain free from falls   Outcome: Ongoing  Patient remains free from falls during this shift. Oriented to call light. Verbalizes understanding. Alert and oriented x4. Bedside table and call light within reach. Bed in lowest position, brakes locked. Nonskid footwear in place. Will continue to monitor. Pt medium fall risk.

## 2018-09-04 NOTE — PROGRESS NOTES
Hospitalist Progress Note      PCP: Ruben Cornelius MD    Date of Admission: 9/2/2018    Chief Complaint:  Left sided weakness       Subjective: continuing left sided numbness/weakness at times. Had hematochezia last night which was evidenced by nusring. Denies any GERD. Medications:  Reviewed    Infusion Medications   Scheduled Medications    pantoprazole  40 mg Intravenous BID    sodium chloride flush  10 mL Intravenous 2 times per day    aspirin  81 mg Oral Daily     PRN Meds: butalbital-acetaminophen-caffeine, sodium chloride flush, magnesium hydroxide, ondansetron, hydrALAZINE      Intake/Output Summary (Last 24 hours) at 09/04/18 1519  Last data filed at 09/04/18 1321   Gross per 24 hour   Intake              570 ml   Output              150 ml   Net              420 ml       Physical Exam Performed:    BP (!) 159/103   Pulse 68   Temp 97.7 °F (36.5 °C) (Oral)   Resp 16   Ht 6' 1\" (1.854 m)   Wt (!) 369 lb 8 oz (167.6 kg)   SpO2 98%   BMI 48.75 kg/m²     General appearance: No apparent distress, appears stated age and cooperative. HEENT: Pupils equal, round, and reactive to light. Conjunctivae/corneas clear. Neck: Supple, with full range of motion. No jugular venous distention. Trachea midline. Respiratory:  Normal respiratory effort. Clear to auscultation, bilaterally without Rales/Wheezes/Rhonchi. Cardiovascular: Regular rate and rhythm with normal S1/S2 without murmurs, rubs or gallops. Abdomen: Soft, non-tender, non-distended with normal bowel sounds. Musculoskeletal: No clubbing, cyanosis or edema bilaterally. Full range of motion without deformity. Skin: Skin color, texture, turgor normal.  No rashes or lesions. Neurologic:  Neurovascularly intact without any focal sensory/motor deficits.  Cranial nerves: II-XII intact, grossly non-focal.  Psychiatric: Alert and oriented, thought content appropriate, normal insight  Capillary Refill: Brisk,< 3 seconds   Peripheral

## 2018-09-04 NOTE — PROGRESS NOTES
Paged Dilan Geiger CNP: \"Pt having a headache. Pt having bloody stools so Fioricet? \"    Awaiting response.

## 2018-09-04 NOTE — CONSULTS
Min: 72  Max: 81  BP (!) 159/103   Pulse 68   Temp 97.7 °F (36.5 °C) (Oral)   Resp 16   Ht 6' 1\" (1.854 m)   Wt (!) 369 lb 8 oz (167.6 kg)   SpO2 98%   BMI 48.75 kg/m²      Physical Exam:   BP (!) 159/103   Pulse 68   Temp 97.7 °F (36.5 °C) (Oral)   Resp 16   Ht 6' 1\" (1.854 m)   Wt (!) 369 lb 8 oz (167.6 kg)   SpO2 98%   BMI 48.75 kg/m²   General appearance: alert, appears stated age and cooperative  Lungs: clear to auscultation bilaterally  Chest wall: no tenderness  Heart: regular rate and rhythm, S1, S2 normal, no murmur, click, rub or gallop  Abdomen: soft, non-tender; bowel sounds normal; no masses,  no organomegaly  Extremities: extremities normal, atraumatic, no cyanosis or edema  Skin: Skin color, texture, turgor normal. No rashes or lesions  Neurologic: Grossly normal    Lab and Imaging Review   Recent Labs      09/02/18 2150 09/02/18 2156 09/03/18   1505   WBC  14.3*   --   9.0   HGB  15.3   --   14.6   MCV  87.6   --   87.6   PLT  261   --   215   NA  140   --    --    K  3.8   --    --    CL  101   --    --    CO2  23   --    --    BUN  14   --    --    CREATININE  0.8*   --    --    GLUCOSE  109*  108   --    CALCIUM  9.6   --    --    PROT  7.6   --    --    LABALBU  4.5   --    --    AST  44*   --    --    ALT  55*   --    --    ALKPHOS  97   --    --    BILITOT  0.3   --    --        Assessment:     Patient Active Problem List    Diagnosis Date Noted    Left sided numbness 09/03/2018    Arterial ischemic stroke, ICA, right, acute (HCC)     HTN (hypertension), benign     JOANNA (obstructive sleep apnea)      33 yo admitted for acute LUE, LLE and Lt facial numbness and tingling w neg brain MRI. Neurology thinks either related to HTN or TIA or a Migraine variant. I was consulted due to 2 episodes of hematochezia yesterday, none today. Plan:   1. Needs colonoscopy after medical stabilization especially w family h/o colon CA  2.  Will follow    Neida Gomes MD       (O) [phone]

## 2018-09-05 VITALS
HEIGHT: 73 IN | TEMPERATURE: 98.2 F | WEIGHT: 315 LBS | HEART RATE: 74 BPM | SYSTOLIC BLOOD PRESSURE: 150 MMHG | OXYGEN SATURATION: 97 % | RESPIRATION RATE: 18 BRPM | DIASTOLIC BLOOD PRESSURE: 95 MMHG | BODY MASS INDEX: 41.75 KG/M2

## 2018-09-05 LAB
ANA INTERPRETATION: NORMAL
ANCA IFA: NORMAL
ANTI-NUCLEAR ANTIBODY (ANA): NEGATIVE
ANTICARDIOLIPIN IGG ANTIBODY: 7 GPL (ref 0–14)
CARDIOLIPIN AB IGM: 0 MPL (ref 0–12)
DRVVT CONFIRMATION TEST: NORMAL RATIO
DRVVT SCREEN: 35 SEC (ref 33–44)
DRVVT,DIL: NORMAL SEC (ref 33–44)
HEXAGONAL PHOSPHOLIPID NEUTRALIZAT TEST: NORMAL
LUPUS ANTICOAG INTERP: NORMAL
LV EF: 58 %
LVEF MODALITY: NORMAL
PLT NEUTA: NORMAL
PROTEIN C ANTIGEN: 120 % (ref 63–153)
PROTEIN S ANTIGEN, TOTAL: 143 % (ref 84–134)
PT D: 14.5 SEC (ref 12–15.5)
PTT D: 39 SEC (ref 32–48)
PTT-D CORR REFLEX: NORMAL SEC (ref 32–48)
PTT-HEPARIN NEUTRALIZED: NORMAL SEC (ref 32–48)
REPTILASE TIME: NORMAL SEC
THROMBIN TIME: NORMAL SEC (ref 14.7–19.5)

## 2018-09-05 PROCEDURE — 6370000000 HC RX 637 (ALT 250 FOR IP): Performed by: PSYCHIATRY & NEUROLOGY

## 2018-09-05 PROCEDURE — G0378 HOSPITAL OBSERVATION PER HR: HCPCS

## 2018-09-05 PROCEDURE — 99225 PR SBSQ OBSERVATION CARE/DAY 25 MINUTES: CPT | Performed by: PSYCHIATRY & NEUROLOGY

## 2018-09-05 PROCEDURE — C9113 INJ PANTOPRAZOLE SODIUM, VIA: HCPCS | Performed by: INTERNAL MEDICINE

## 2018-09-05 PROCEDURE — 6360000002 HC RX W HCPCS: Performed by: INTERNAL MEDICINE

## 2018-09-05 PROCEDURE — 96376 TX/PRO/DX INJ SAME DRUG ADON: CPT

## 2018-09-05 PROCEDURE — 93306 TTE W/DOPPLER COMPLETE: CPT | Performed by: INTERNAL MEDICINE

## 2018-09-05 PROCEDURE — 2580000003 HC RX 258: Performed by: INTERNAL MEDICINE

## 2018-09-05 RX ADMIN — Medication 10 ML: at 08:20

## 2018-09-05 RX ADMIN — ASPIRIN 81 MG 81 MG: 81 TABLET ORAL at 08:17

## 2018-09-05 RX ADMIN — PANTOPRAZOLE SODIUM 40 MG: 40 INJECTION, POWDER, FOR SOLUTION INTRAVENOUS at 08:17

## 2018-09-05 NOTE — DISCHARGE SUMMARY
Hospital Medicine Discharge Summary    Patient ID: Stephanie Payton      Patient's PCP: Kasia Lopez MD    Admit Date: 9/2/2018     Discharge Date:   9/5/2018    Admitting Physician: Misael Blunt MD     Discharge Physician: Joan Sales MD     Discharge Diagnoses: Active Hospital Problems    Diagnosis Date Noted    Dyslipidemia [E78.5]     Left sided numbness [R20.0] 09/03/2018    Arterial ischemic stroke, ICA, right, acute (HCC) [I63.231]     HTN (hypertension), benign [I10]     JOANNA (obstructive sleep apnea) [G47.33]        The patient was seen and examined on day of discharge and this discharge summary is in conjunction with any daily progress note from day of discharge. History Of Present Illness:       34 y.o. male had right upper molar pulled out on Wednesday. On Thursday he felt a little feverish and felt weak overall while at work. He was taken to [facility] for left arm and leg weakness. Blood work and EKG done which were normal. He was sent home. Since then he has couple of episodes of left arm and leg weakness. Yesterday again he felt worsening of the weakness on the left side along with left facial tingling. No speech problems. Came in to get evaluated     He states he has been having on and off chest pain going to his left arm at times. Now he states he noticed  blood in the bowel movement which is new. He sees his left eye is blurry at times starting this morning     No family history of strokes. History of brain tumor in maternal uncle. No history of DVTs in the family    Hospital Course:     Admitted with intermittent episodes of Left-sided numbness/weakness. neurology consulted. CT had an MRI brain negative for stroke.   CTA head and neck - 2mm rt ICA aneurysm. Neurosurgery consulted - advised f/u as out pt. Esr- low. Etiology thought to be atypical migraines. During the hospital course , patient had episodes of rectal bleeding ×2. Hemoccult positive. Patient has family history of cancer. GI consulted. Advised outpatient follow-up for colonoscopy . H/h  remainedstable.      Non specific Chest pain: Brief, one episode. EKG nonacute. Troponin neg. Echo-showed preserved ejection fraction.      Morbid obesity: BMI 48        elevated blood pressure without diagnosis of hypertension: Advised close follow-up with PCP in one week to see the need for starting antihypertensives. Advised not to use nsaid's . Advised weight loss too. Physical Exam Performed:     BP (!) 150/95   Pulse 74   Temp 98.2 °F (36.8 °C) (Oral)   Resp 18   Ht 6' 1\" (1.854 m)   Wt (!) 369 lb 8 oz (167.6 kg)   SpO2 97%   BMI 48.75 kg/m²       General appearance:  No apparent distress, appears stated age and cooperative. HEENT:  Normal cephalic, atraumatic without obvious deformity. Pupils equal, round, and reactive to light. Extra ocular muscles intact. Conjunctivae/corneas clear. Neck: Supple, with full range of motion. No jugular venous distention. Trachea midline. Respiratory:  Normal respiratory effort. Clear to auscultation, bilaterally without Rales/Wheezes/Rhonchi. Cardiovascular:  Regular rate and rhythm with normal S1/S2 without murmurs, rubs or gallops. Abdomen: Soft, non-tender, non-distended with normal bowel sounds. Musculoskeletal:  No clubbing, cyanosis or edema bilaterally. Full range of motion without deformity. Skin: Skin color, texture, turgor normal.  No rashes or lesions. Neurologic:  Neurovascularly intact without any focal sensory/motor deficits. Cranial nerves: II-XII intact, grossly non-focal.  Psychiatric:  Alert and oriented, thought content appropriate, normal insight  Capillary Refill: Brisk,< 3 seconds   Peripheral Pulses: +2 palpable, equal bilaterally       Labs:  For convenience and continuity at follow-up the following most recent labs are provided:      CBC:    Lab Results   Component Value Date    WBC 9.0 09/03/2018    HGB 14.6 09/03/2018    HCT 42.3 09/03/2018     09/03/2018       Renal:    Lab Results   Component Value Date     09/02/2018    K 3.8 09/02/2018     09/02/2018    CO2 23 09/02/2018    BUN 14 09/02/2018    CREATININE 0.8 09/02/2018    CALCIUM 9.6 09/02/2018         Significant Diagnostic Studies    Radiology:   CTA Neck W Contrast   Final Result   Addendum 1 of 1   ADDENDUM:   3D reconstructed images were performed on a separate workstation and are    now   provided for review. There is no change in the initial report. Final   1. There is a 2 mm outpouching arising from the right supraclinoid ICA, which   may represent an infundibulum versus a small aneurysm. 2. Otherwise, unremarkable CTA of the head and neck. These results were sent to the Results [id number] ([address] [place] [address]) on 9/3/2018   at 2:58 pm to be communicated to the referring/covering health care   provider/office. CTA HEAD W CONTRAST   Final Result   Addendum 1 of 1   ADDENDUM:   3D reconstructed images were performed on a separate workstation and are    now   provided for review. There is no change in the initial report. Final   1. There is a 2 mm outpouching arising from the right supraclinoid ICA, which   may represent an infundibulum versus a small aneurysm. 2. Otherwise, unremarkable CTA of the head and neck. These results were sent to the [employer] [id number] ([address] [place] [address]) on 9/3/2018   at 2:58 pm to be communicated to the referring/covering health care   provider/office. MRI Brain W WO Contrast   Final Result   1. No acute intracranial abnormality. No acute infarct. 2. Scattered sinusitis. CT HEAD WO CONTRAST   Final Result   No acute intracranial abnormality. Paranasal sinus disease, greatest of the right ethmoid sinus.                 Consults:     IP CONSULT TO HOSPITALIST  IP CONSULT TO NEUROLOGY  IP CONSULT TO NEUROLOGY  IP CONSULT TO NEUROSURGERY  IP CONSULT TO GI    Disposition:  Home    Condition at Discharge: Stable    Discharge Instructions/Follow-up:   F/u with PCP in 1 week- needs f/u on High BP to see the need to initiate antihypertensives  F/u with GI for out pt c-scope  F/u with neurosurgery in 1 month for aneurysm    Code Status:  Full Code     Activity: activity as tolerated    Diet: regular diet      Discharge Medications: There are no discharge medications for this patient. Time Spent on discharge is more than 20 minutes in the examination, evaluation, counseling and review of medications and discharge plan. Signed:    Lor Mancia MD   9/5/2018      Thank you Neftaly Turcios MD for the opportunity to be involved in this patient's care. If you have any questions or concerns please feel free to contact me at [phone].

## 2018-09-05 NOTE — PROGRESS NOTES
Patient up in chair after ECHO, will check BP in 5 minutes per MD. Call light within reach, no needs per patient at this time. Will monitor.

## 2018-09-05 NOTE — PROGRESS NOTES
Pt d/c'd home. Removed peripheral IV and stopped bleeding. Catheter intact. Pt tolerated well. No redness noted at site. Notified [facility] and removed tele box. Reviewed d/c instructions, home meds, and  f/u information utilizing teach-back method. Patient verbalized understanding.

## 2018-09-05 NOTE — PROGRESS NOTES
Objective:  Exam:  Constitutional:   Vitals:    09/04/18 1949 09/04/18 2242 09/05/18 0338 09/05/18 0733   BP: (!) 152/83 (!) 148/96 (!) 149/95 (!) 152/91   Pulse: 71 83 77 69   Resp: 18 20 18 18   Temp: 98.1 °F (36.7 °C) 98.6 °F (37 °C) 97.8 °F (36.6 °C) 98 °F (36.7 °C)   TempSrc: Oral Oral Oral Oral   SpO2: 97% 95% 96% 97%   Weight:       Height:           General appearance: NAD. Eye: No icterus. PRRR. Neck: supple  Cardiovascular:          No lower leg edema with good pulsation. Mental Status: Oriented to person, place, problem, and time. Fluent speech. Normal attention span and concentration. Cranial Nerves:   II: Visual fields: Full to confrontation  III: Pupils: equal, round, reactive to light  III,IV,VI: Extra Ocular Movements are intact. No nystagmus  V: Facial sensation is intact to pin prick and light touch  VII: Facial strength and movements: intact and symmetric  VIII: Hearing: Intact to finger rub bilaterally  IX: Palate elevation is symmetric  XI: Shoulder shrug is intact  XII: Tongue movements are normal  Musculoskeletal: 5/5 in all 4 extremities. Normal tone. Reflexes: Bilateral biceps 2/4, triceps 2/4, brachial radialis 2/4, knee 2/4 and ankle 2/4. Coordination: no pronator drift, no dysmetria. .  Sensation: normal to all modalities.   Gait/Posture: steady      Data:  LABS:   Lab Results   Component Value Date     09/02/2018    K 3.8 09/02/2018     09/02/2018    CO2 23 09/02/2018    BUN 14 09/02/2018    CREATININE 0.8 09/02/2018    GFRAA >60 09/02/2018    LABGLOM >60 09/02/2018    GLUCOSE 108 09/02/2018    CALCIUM 9.6 09/02/2018     Lab Results   Component Value Date    WBC 9.0 09/03/2018    RBC 4.83 09/03/2018    HGB 14.6 09/03/2018    HCT 42.3 09/03/2018    MCV 87.6 09/03/2018    RDW 13.5 09/03/2018     09/03/2018   No results found for: INR, PROTIME  Neuroimaging and/or  labs reviewed by me and discussed results with the patient    Impression:  Acute left-sided

## 2018-09-07 LAB
PROTHROMBIN G20210A MUTATION: NEGATIVE
PT PCR SPECIMEN: NORMAL

## 2018-09-21 LAB
EKG ATRIAL RATE: 94 BPM
EKG DIAGNOSIS: NORMAL
EKG P AXIS: 38 DEGREES
EKG P-R INTERVAL: 164 MS
EKG Q-T INTERVAL: 348 MS
EKG QRS DURATION: 102 MS
EKG QTC CALCULATION (BAZETT): 435 MS
EKG R AXIS: 23 DEGREES
EKG T AXIS: 18 DEGREES
EKG VENTRICULAR RATE: 94 BPM

## 2019-04-24 ENCOUNTER — HOSPITAL ENCOUNTER (OUTPATIENT)
Dept: CT IMAGING | Age: 29
Discharge: HOME | End: 2019-04-24
Payer: COMMERCIAL

## 2019-04-24 DIAGNOSIS — I67.1 CEREBRAL ANEURYSM, NONRUPTURED: ICD-10-CM

## 2019-04-24 PROCEDURE — 70496 CT ANGIOGRAPHY HEAD: CPT

## 2019-04-24 PROCEDURE — 6360000004 HC RX CONTRAST MEDICATION: Performed by: NEUROLOGICAL SURGERY

## 2019-04-24 RX ADMIN — IOPAMIDOL 75 ML: 755 INJECTION, SOLUTION INTRAVENOUS at 16:52

## 2019-10-17 ENCOUNTER — PREPPED CHART (OUTPATIENT)
Dept: URBAN - METROPOLITAN AREA CLINIC 47 | Facility: CLINIC | Age: 29
End: 2019-10-17

## 2020-11-15 ENCOUNTER — HOSPITAL ENCOUNTER (EMERGENCY)
Age: 30
Discharge: HOME | End: 2020-11-15
Attending: EMERGENCY MEDICINE
Payer: COMMERCIAL

## 2020-11-15 VITALS
OXYGEN SATURATION: 99 % | SYSTOLIC BLOOD PRESSURE: 145 MMHG | RESPIRATION RATE: 18 BRPM | DIASTOLIC BLOOD PRESSURE: 78 MMHG | BODY MASS INDEX: 42.66 KG/M2 | HEART RATE: 99 BPM | TEMPERATURE: 98.1 F | HEIGHT: 72 IN | WEIGHT: 315 LBS

## 2020-11-15 PROCEDURE — 90471 IMMUNIZATION ADMIN: CPT | Performed by: EMERGENCY MEDICINE

## 2020-11-15 PROCEDURE — 6360000002 HC RX W HCPCS: Performed by: EMERGENCY MEDICINE

## 2020-11-15 PROCEDURE — 90715 TDAP VACCINE 7 YRS/> IM: CPT | Performed by: EMERGENCY MEDICINE

## 2020-11-15 PROCEDURE — 99283 EMERGENCY DEPT VISIT LOW MDM: CPT

## 2020-11-15 RX ORDER — AMOXICILLIN AND CLAVULANATE POTASSIUM 875; 125 MG/1; MG/1
1 TABLET, FILM COATED ORAL 2 TIMES DAILY
Qty: 14 TABLET | Refills: 0 | Status: SHIPPED | OUTPATIENT
Start: 2020-11-15 | End: 2020-11-22

## 2020-11-15 RX ADMIN — TETANUS TOXOID, REDUCED DIPHTHERIA TOXOID AND ACELLULAR PERTUSSIS VACCINE, ADSORBED 0.5 ML: 5; 2.5; 8; 8; 2.5 SUSPENSION INTRAMUSCULAR at 05:50

## 2020-11-15 ASSESSMENT — PAIN DESCRIPTION - DESCRIPTORS: DESCRIPTORS: ACHING

## 2020-11-15 ASSESSMENT — ENCOUNTER SYMPTOMS
NAUSEA: 0
COUGH: 0
SORE THROAT: 0
VOMITING: 0
ABDOMINAL PAIN: 0
BACK PAIN: 0
SHORTNESS OF BREATH: 0

## 2020-11-15 ASSESSMENT — PAIN SCALES - GENERAL: PAINLEVEL_OUTOF10: 1

## 2020-11-15 ASSESSMENT — PAIN DESCRIPTION - LOCATION: LOCATION: LEG

## 2020-11-15 ASSESSMENT — PAIN DESCRIPTION - PAIN TYPE: TYPE: ACUTE PAIN

## 2020-11-15 ASSESSMENT — PAIN DESCRIPTION - ORIENTATION: ORIENTATION: LOWER;RIGHT

## 2020-11-15 NOTE — ED NOTES
Dry dressing non-adherent applied to open wounds; DC instructions reviewed; prescriptions and follow up discussed pt verbalized understanding      Salina Salazar RN  11/15/20 0725

## 2020-11-15 NOTE — ED NOTES
Wound care to Right lower leg; right elbow and hand pt tolerated procedure well        Tin Weeks RN  11/15/20 1259

## 2020-11-15 NOTE — ED PROVIDER NOTES
[address] [facility]  ED  EMERGENCY DEPARTMENT ENCOUNTER      Pt Name: Vini Vargas  MRN: [id number]  [place] 1990  Date of evaluation: 11/15/2020  Provider: Álvaro Perez MD    CHIEF COMPLAINT       Chief Complaint   Patient presents with   [facility]     Room mates pit bull attacked pt; bites to Right shin; elbow and lower right leg. HISTORY OF PRESENT ILLNESS   (Location/Symptom, Timing/Onset, Context/Setting, Quality, Duration, Modifying Factors, Severity)  Note limiting factors. Vini Vargas is a 31 y.o. male who presents to the emergency department     Patient is a 27-year-old male with a past medical history of anxiety who presents with dog bite. Patient reports that his roommates pitburichard became aggressive and bit him in his right hand, right elbow, and right back of leg. Patient does not believe that the dog has been vaccinated against rabies but is not showing any symptoms or signs and they can keep an eye on them for the next couple days. He reported that he came to the emergency department because there was a small amount of fatty tissue sticking out of the puncture wound on his leg and did not know if it needed any type of intervention. He believes that his last tetanus shot was more than 10 years ago. Denies any other symptoms or concerns at this time. The history is provided by the patient. Nursing Notes were reviewed. REVIEW OF SYSTEMS    (2-9 systems for level 4, 10 or more for level 5)     Review of Systems   Constitutional: Negative for chills and fever. HENT: Negative for congestion and sore throat. Eyes: Negative for visual disturbance. Respiratory: Negative for cough and shortness of breath. Cardiovascular: Negative for chest pain. Gastrointestinal: Negative for abdominal pain, nausea and vomiting. Genitourinary: Negative for dysuria and hematuria. Musculoskeletal: Negative for back pain and neck pain.    Skin: Positive for wound.   Neurological: Negative for light-headedness and headaches. All other systems reviewed and are negative. Except as noted above the remainder of the review of systems was reviewed and negative. PAST MEDICAL HISTORY     Past Medical History:   Diagnosis Date    Anxiety     Brain aneurysm 2018    right side          SURGICAL HISTORY     History reviewed. No pertinent surgical history. CURRENT MEDICATIONS       Discharge Medication List as of 11/15/2020  5:51 AM          ALLERGIES     Patient has no known allergies. FAMILY HISTORY     History reviewed. No pertinent family history.        SOCIAL HISTORY       Social History     Socioeconomic History    Marital status: Single     Spouse name: None    Number of children: None    Years of education: None    Highest education level: None   Occupational History    None   Social Needs    Financial resource strain: None    Food insecurity     Worry: None     Inability: None    Transportation needs     Medical: None     Non-medical: None   Tobacco Use    Smoking status: Former Smoker    Smokeless tobacco: Never Used   Substance and Sexual Activity    Alcohol use: Yes     Comment: weekends     Drug use: None    Sexual activity: None   Lifestyle    Physical activity     Days per week: None     Minutes per session: None    Stress: None   Relationships    Social connections     Talks on phone: None     Gets together: None     Attends Zoroastrianism service: None     Active member of club or organization: None     Attends meetings of clubs or organizations: None     Relationship status: None    Intimate partner violence     Fear of current or ex partner: None     Emotionally abused: None     Physically abused: None     Forced sexual activity: None   Other Topics Concern    None   Social History Narrative    None       SCREENINGS               PHYSICAL EXAM    (up to 7 for level 4, 8 or more for level 5)     ED Triage Vitals [11/15/20 (50) 279-120 BP Temp Temp Source Pulse Resp SpO2 Height Weight   (!) 160/86 98.1 °F (36.7 °C) Oral 119 18 100 % 6' (1.829 m) (!) 350 lb (158.8 kg)       Physical Exam  Vitals signs and nursing note reviewed. Constitutional:       General: He is not in acute distress. Appearance: Normal appearance. HENT:      Head: Normocephalic and atraumatic. Nose: Nose normal. No congestion. Mouth/Throat:      Mouth: Mucous membranes are moist.   Eyes:      Conjunctiva/sclera: Conjunctivae normal.   Neck:      Musculoskeletal: Normal range of motion and neck supple. Cardiovascular:      Rate and Rhythm: Normal rate and regular rhythm. Pulses: Normal pulses. Pulmonary:      Effort: Pulmonary effort is normal. No respiratory distress. Musculoskeletal:        Arms:         Hands:         Legs:    Skin:     General: Skin is warm and dry. Neurological:      General: No focal deficit present. Mental Status: He is alert and oriented to person, place, and time. DIAGNOSTIC RESULTS     EKG: All EKG's are interpreted by the Emergency Department Physician who either signs or Co-signs this chart in the absence of a cardiologist.        RADIOLOGY:     Interpretation per the Radiologist below, if available at the time of this note:    No orders to display         LABS:  Labs Reviewed - No data to display    All other labs were within normal range or not returned as of this dictation. EMERGENCY DEPARTMENT COURSE and DIFFERENTIAL DIAGNOSIS/MDM:   Vitals:    Vitals:    11/15/20 0331 11/15/20 0608   BP: (!) 160/86 (!) 145/78   Pulse: 119 99   Resp: 18 18   Temp: 98.1 °F (36.7 °C)    TempSrc: Oral    SpO2: 100% 99%   Weight: (!) 350 lb (158.8 kg)    Height: 6' (1.829 m)        Patient evaluated and previous record reviewed. Patient presents with multiple puncture wounds and superficial lacerations after being attacked by his roommate's dog. Vital signs notable for initial tachycardia.   Physical exam as documented above. Patient's tetanus is updated. His wounds are cleaned and dressed. Nothing is gaping enough that would require suturing. Explained signs and symptoms to look out for at home including at home care instructions as well as return precautions. Patient voices understanding and is amenable with this plan. Prescribed course of Augmentin. Advised him to follow-up for wound recheck in a few days. CONSULTS:  None    PROCEDURES:  Unless otherwise noted below, none     Procedures      FINAL IMPRESSION      1. Dog bite, initial encounter          DISPOSITION/PLAN   DISPOSITION Decision To Discharge 11/15/2020 05:36:58 AM      PATIENT REFERRED TO:  Guillermo Mack MD  [address] [facility] [address]  [address] [facility] [address]  [phone]    Schedule an appointment as soon as possible for a visit in 1 week  For wound re-check      DISCHARGE MEDICATIONS:  Discharge Medication List as of 11/15/2020  5:51 AM      START taking these medications    Details   amoxicillin-clavulanate (AUGMENTIN) 875-125 MG per tablet Take 1 tablet by mouth 2 times daily for 7 days, Disp-14 tablet,R-0Print           Controlled Substances Monitoring:     No flowsheet data found.     (Please note that portions of this note were completed with a voice recognition program.  Efforts were made to edit the dictations but occasionally words are mis-transcribed.)    Gilbert Cornelius MD (electronically signed)  Attending Emergency Physician            Vini Toussaint MD  11/15/20 3695

## 2022-07-03 ENCOUNTER — APPOINTMENT (OUTPATIENT)
Dept: GENERAL RADIOLOGY | Age: 32
End: 2022-07-03
Payer: COMMERCIAL

## 2022-07-03 ENCOUNTER — APPOINTMENT (OUTPATIENT)
Dept: CT IMAGING | Age: 32
End: 2022-07-03
Payer: COMMERCIAL

## 2022-07-03 ENCOUNTER — HOSPITAL ENCOUNTER (EMERGENCY)
Age: 32
Discharge: HOME | End: 2022-07-03
Payer: COMMERCIAL

## 2022-07-03 VITALS
DIASTOLIC BLOOD PRESSURE: 85 MMHG | BODY MASS INDEX: 42.66 KG/M2 | OXYGEN SATURATION: 97 % | HEIGHT: 72 IN | RESPIRATION RATE: 19 BRPM | WEIGHT: 315 LBS | SYSTOLIC BLOOD PRESSURE: 143 MMHG | HEART RATE: 90 BPM | TEMPERATURE: 98.9 F

## 2022-07-03 DIAGNOSIS — R20.2 NUMBNESS AND TINGLING IN LEFT HAND: ICD-10-CM

## 2022-07-03 DIAGNOSIS — R20.0 NUMBNESS AND TINGLING IN LEFT HAND: ICD-10-CM

## 2022-07-03 DIAGNOSIS — R07.9 CHEST PAIN, UNSPECIFIED TYPE: Primary | ICD-10-CM

## 2022-07-03 LAB
A/G RATIO: 1.6 (ref 1.1–2.2)
ALBUMIN SERPL-MCNC: 4.6 G/DL (ref 3.4–5)
ALP BLD-CCNC: 101 U/L (ref 40–129)
ALT SERPL-CCNC: 29 U/L (ref 10–40)
ANION GAP SERPL CALCULATED.3IONS-SCNC: 16 MMOL/L (ref 3–16)
AST SERPL-CCNC: 33 U/L (ref 15–37)
BASOPHILS ABSOLUTE: 0 K/UL (ref 0–0.2)
BASOPHILS RELATIVE PERCENT: 0.4 %
BILIRUB SERPL-MCNC: <0.2 MG/DL (ref 0–1)
BUN BLDV-MCNC: 11 MG/DL (ref 7–20)
CALCIUM SERPL-MCNC: 9.3 MG/DL (ref 8.3–10.6)
CHLORIDE BLD-SCNC: 102 MMOL/L (ref 99–110)
CO2: 22 MMOL/L (ref 21–32)
CREAT SERPL-MCNC: 0.7 MG/DL (ref 0.9–1.3)
D DIMER: 3.39 UG/ML FEU (ref 0–0.6)
EOSINOPHILS ABSOLUTE: 0.2 K/UL (ref 0–0.6)
EOSINOPHILS RELATIVE PERCENT: 3.3 %
GFR AFRICAN AMERICAN: >60
GFR NON-AFRICAN AMERICAN: >60
GLUCOSE BLD-MCNC: 157 MG/DL (ref 70–99)
HCT VFR BLD CALC: 41.2 % (ref 40.5–52.5)
HEMOGLOBIN: 14.1 G/DL (ref 13.5–17.5)
LIPASE: 42 U/L (ref 13–60)
LYMPHOCYTES ABSOLUTE: 1.7 K/UL (ref 1–5.1)
LYMPHOCYTES RELATIVE PERCENT: 22.9 %
MAGNESIUM: 1.9 MG/DL (ref 1.8–2.4)
MCH RBC QN AUTO: 29.6 PG (ref 26–34)
MCHC RBC AUTO-ENTMCNC: 34.3 G/DL (ref 31–36)
MCV RBC AUTO: 86.5 FL (ref 80–100)
MONOCYTES ABSOLUTE: 1.1 K/UL (ref 0–1.3)
MONOCYTES RELATIVE PERCENT: 14.6 %
NEUTROPHILS ABSOLUTE: 4.3 K/UL (ref 1.7–7.7)
NEUTROPHILS RELATIVE PERCENT: 58.8 %
PDW BLD-RTO: 13.2 % (ref 12.4–15.4)
PLATELET # BLD: 220 K/UL (ref 135–450)
PMV BLD AUTO: 7.7 FL (ref 5–10.5)
POTASSIUM REFLEX MAGNESIUM: 3.5 MMOL/L (ref 3.5–5.1)
PRO-BNP: 5 PG/ML (ref 0–124)
RBC # BLD: 4.77 M/UL (ref 4.2–5.9)
SODIUM BLD-SCNC: 140 MMOL/L (ref 136–145)
TOTAL PROTEIN: 7.5 G/DL (ref 6.4–8.2)
TROPONIN: <0.01 NG/ML
TROPONIN: <0.01 NG/ML
WBC # BLD: 7.3 K/UL (ref 4–11)

## 2022-07-03 PROCEDURE — 83690 ASSAY OF LIPASE: CPT

## 2022-07-03 PROCEDURE — 80053 COMPREHEN METABOLIC PANEL: CPT

## 2022-07-03 PROCEDURE — 83735 ASSAY OF MAGNESIUM: CPT

## 2022-07-03 PROCEDURE — 93005 ELECTROCARDIOGRAM TRACING: CPT | Performed by: EMERGENCY MEDICINE

## 2022-07-03 PROCEDURE — 83880 ASSAY OF NATRIURETIC PEPTIDE: CPT

## 2022-07-03 PROCEDURE — 99285 EMERGENCY DEPT VISIT HI MDM: CPT

## 2022-07-03 PROCEDURE — 96374 THER/PROPH/DIAG INJ IV PUSH: CPT

## 2022-07-03 PROCEDURE — 85379 FIBRIN DEGRADATION QUANT: CPT

## 2022-07-03 PROCEDURE — 6360000002 HC RX W HCPCS: Performed by: NURSE PRACTITIONER

## 2022-07-03 PROCEDURE — 71045 X-RAY EXAM CHEST 1 VIEW: CPT

## 2022-07-03 PROCEDURE — 6360000004 HC RX CONTRAST MEDICATION: Performed by: NURSE PRACTITIONER

## 2022-07-03 PROCEDURE — 71260 CT THORAX DX C+: CPT | Performed by: NURSE PRACTITIONER

## 2022-07-03 PROCEDURE — 84484 ASSAY OF TROPONIN QUANT: CPT

## 2022-07-03 PROCEDURE — 2580000003 HC RX 258: Performed by: NURSE PRACTITIONER

## 2022-07-03 PROCEDURE — 85025 COMPLETE CBC W/AUTO DIFF WBC: CPT

## 2022-07-03 RX ORDER — KETOROLAC TROMETHAMINE 30 MG/ML
30 INJECTION, SOLUTION INTRAMUSCULAR; INTRAVENOUS ONCE
Status: COMPLETED | OUTPATIENT
Start: 2022-07-03 | End: 2022-07-03

## 2022-07-03 RX ORDER — 0.9 % SODIUM CHLORIDE 0.9 %
1000 INTRAVENOUS SOLUTION INTRAVENOUS ONCE
Status: COMPLETED | OUTPATIENT
Start: 2022-07-03 | End: 2022-07-03

## 2022-07-03 RX ADMIN — KETOROLAC TROMETHAMINE 30 MG: 30 INJECTION, SOLUTION INTRAMUSCULAR at 22:19

## 2022-07-03 RX ADMIN — IOPAMIDOL 75 ML: 755 INJECTION, SOLUTION INTRAVENOUS at 21:28

## 2022-07-03 RX ADMIN — SODIUM CHLORIDE 1000 ML: 9 INJECTION, SOLUTION INTRAVENOUS at 20:37

## 2022-07-03 ASSESSMENT — PAIN - FUNCTIONAL ASSESSMENT: PAIN_FUNCTIONAL_ASSESSMENT: 0-10

## 2022-07-03 ASSESSMENT — PAIN SCALES - GENERAL
PAINLEVEL_OUTOF10: 7
PAINLEVEL_OUTOF10: 2

## 2022-07-04 LAB
EKG ATRIAL RATE: 122 BPM
EKG DIAGNOSIS: NORMAL
EKG P AXIS: 34 DEGREES
EKG P-R INTERVAL: 156 MS
EKG Q-T INTERVAL: 320 MS
EKG QRS DURATION: 90 MS
EKG QTC CALCULATION (BAZETT): 456 MS
EKG R AXIS: 22 DEGREES
EKG T AXIS: 21 DEGREES
EKG VENTRICULAR RATE: 122 BPM

## 2022-07-04 PROCEDURE — 93010 ELECTROCARDIOGRAM REPORT: CPT | Performed by: INTERNAL MEDICINE

## 2022-07-04 NOTE — ED PROVIDER NOTES
Evaluated by Advanced Practice Provider    [facility]  ED    CHIEF COMPLAINT  Chest Pain    HISTORY OF PRESENT ILLNESS  Rosa Vences is a 29 y.o. male who presents to the ED complaining of CP. Woke up with pain in his left chest and his left hand is numb. Denies pain in his neck but reports tightness. Pain started around 6:50 pm. Reports now and again he has had small CP but nothing like this. Pain does go through into his back. Numbness mainly in his pinky and going up his forearm. Reports associated SOB, nausea, dizziness/light headed. Denies associated vomiting, diaphoresis. He was at a bar last night, was drinnking. Denies history of CAD. He had HTN, obesity. GM  of MI. Current smoker. -HLD, -DM. He is not taking medications for his high blood pressure, they make him feel weird. No recent travel, trauma, surgery. Denies cough. Denies h/o of DVT, PE. The patient is currently rating their pain as 7/10 and describes it as a aching type of pain. Treatments tried prior to arrival in the ED include: none. The patient arrived to the ED via private car. PAST MEDICAL HISTORY    Past Medical History:   Diagnosis Date    Anxiety     Brain aneurysm 2018    right side        SURGICAL HISTORY    History reviewed. No pertinent surgical history. CURRENT MEDICATIONS        ALLERGIES    No Known Allergies    FAMILY HISTORY    History reviewed. No pertinent family history.     SOCIAL HISTORY    Social History     Socioeconomic History    Marital status: Single     Spouse name: None    Number of children: None    Years of education: None    Highest education level: None   Occupational History    None   Tobacco Use    Smoking status: Former Smoker    Smokeless tobacco: Never Used   Vaping Use    Vaping Use: Never used   Substance and Sexual Activity    Alcohol use: Yes     Comment: weekends     Drug use: None    Sexual activity: None   Other Topics Concern    None Social History Narrative    None     Social Determinants of Health     Financial Resource Strain:     Difficulty of Paying Living Expenses: Not on file   Food Insecurity:     Worried About Running Out of Food in the Last Year: Not on file    Josselin of Food in the Last Year: Not on file   Transportation Needs:     Lack of Transportation (Medical): Not on file    Lack of Transportation (Non-Medical): Not on file   Physical Activity:     Days of Exercise per Week: Not on file    Minutes of Exercise per Session: Not on file   Stress:     Feeling of Stress : Not on file   Social Connections:     Frequency of Communication with Friends and Family: Not on file    Frequency of Social Gatherings with Friends and Family: Not on file    Attends Restorationist Services: Not on file    Active Member of [address] [place] [address] [employer] or Organizations: Not on file    Attends Club or Organization Meetings: Not on file    Marital Status: Not on file   Intimate Partner Violence:     Fear of Current or Ex-Partner: Not on file    Emotionally Abused: Not on file    Physically Abused: Not on file    Sexually Abused: Not on file   Housing Stability:     Unable to Pay for Housing in the Last Year: Not on file    Number of [place] in the Last Year: Not on file    Unstable Housing in the Last Year: Not on file       REVIEW OF SYSTEMS    10 systems reviewed, pertinent positives per HPI otherwise noted to be negative    PHYSICAL EXAM  Vitals:    07/03/22 2316   BP: (!) 143/85   Pulse: 90   Resp: 19   Temp:    SpO2: 97%     GENERAL: Patient is well-developed, morbidly obese. Awake and alert. Cooperative. Resting in bed. No apparent distress. HEENT:  Normocephalic, atraumatic. Conjunctiva appear normal. Sclera is non-icteric. External ears are normal.    NECK: Supple with normal ROM. Trachea midline. LUNGS: Equal and symmetric chest rise. Breathing is unlabored. Speaking comfortably in full sentences.  Lungs are clear bilaterally to auscultation. Without wheezing, rales, or rhonchi. CADIOVASCULAR: Tachycardic rate and rhythm. Normal S1-S2 sounds. No murmurs, rubs, or gallops. Capillary refill is brisk in all 4 extremities. Bilateral lower extremities are equal in size, there is no swelling observed. There is no tenderness to palpation. There is no erythema observed or warmth palpated. GI: Soft, nontender, nondistended with positive bowel sounds. No rebound tenderness, guarding or any peritoneal signs. No masses or hepatosplenomegaly    MUSCULOSKELETAL:  No gross deformities or trauma noted. Moving all extremities equally and appropriately. Normal ROM. SKIN: Warm/dry. Skin is intact. No rashes/lesions noted. PSYCHIATRIC: Mood and affect appropriate. Speech is clear and articulate. NEUROLOGIC: Alert and oriented. No focal motor or sensory deficits. LABS  I have reviewed all labs for this visit.    Results for orders placed or performed during the hospital encounter of 07/03/22   CBC with Auto Differential   Result Value Ref Range    WBC 7.3 4.0 - 11.0 K/uL    RBC 4.77 4.20 - 5.90 M/uL    Hemoglobin 14.1 13.5 - 17.5 g/dL    Hematocrit 41.2 40.5 - 52.5 %    MCV 86.5 80.0 - 100.0 fL    MCH 29.6 26.0 - 34.0 pg    MCHC 34.3 31.0 - 36.0 g/dL    RDW 13.2 12.4 - 15.4 %    Platelets 612 440 - 636 K/uL    MPV 7.7 5.0 - 10.5 fL    Neutrophils % 58.8 %    Lymphocytes % 22.9 %    Monocytes % 14.6 %    Eosinophils % 3.3 %    Basophils % 0.4 %    Neutrophils Absolute 4.3 1.7 - 7.7 K/uL    Lymphocytes Absolute 1.7 1.0 - 5.1 K/uL    Monocytes Absolute 1.1 0.0 - 1.3 K/uL    Eosinophils Absolute 0.2 0.0 - 0.6 K/uL    Basophils Absolute 0.0 0.0 - 0.2 K/uL   Comprehensive Metabolic Panel w/ Reflex to MG   Result Value Ref Range    Sodium 140 136 - 145 mmol/L    Potassium reflex Magnesium 3.5 3.5 - 5.1 mmol/L    Chloride 102 99 - 110 mmol/L    CO2 22 21 - 32 mmol/L    Anion Gap 16 3 - 16    Glucose 157 (H) 70 - 99 mg/dL    BUN 11 7 - 20 mg/dL CREATININE 0.7 (L) 0.9 - 1.3 mg/dL    GFR Non-African American >60 >60    GFR African American >60 >60    Calcium 9.3 8.3 - 10.6 mg/dL    Total Protein 7.5 6.4 - 8.2 g/dL    Albumin 4.6 3.4 - 5.0 g/dL    Albumin/Globulin Ratio 1.6 1.1 - 2.2    Total Bilirubin <0.2 0.0 - 1.0 mg/dL    Alkaline Phosphatase 101 40 - 129 U/L    ALT 29 10 - 40 U/L    AST 33 15 - 37 U/L   Troponin   Result Value Ref Range    Troponin <0.01 <0.01 ng/mL   Brain Natriuretic Peptide   Result Value Ref Range    Pro-BNP 5 0 - 124 pg/mL   D-Dimer, Quantitative   Result Value Ref Range    D-Dimer, Quant 3.39 (H) 0.00 - 0.60 ug/mL FEU   Lipase   Result Value Ref Range    Lipase 42.0 13.0 - 60.0 U/L   Magnesium   Result Value Ref Range    Magnesium 1.90 1.80 - 2.40 mg/dL   Troponin   Result Value Ref Range    Troponin <0.01 <0.01 ng/mL   EKG 12 Lead   Result Value Ref Range    Ventricular Rate 122 BPM    Atrial Rate 122 BPM    P-R Interval 156 ms    QRS Duration 90 ms    Q-T Interval 320 ms    QTc Calculation (Bazett) 456 ms    P Axis 34 degrees    R Axis 22 degrees    T Axis 21 degrees    Diagnosis       Sinus tachycardiaInferior infarct (cited on or before 03-JUL-2022)Abnormal ECGWhen compared with ECG of 02-SEP-2018 22:02,Borderline criteria for Lateral infarct are no longer Present       RADIOLOGY    XR CHEST PORTABLE    Result Date: 7/3/2022  EXAMINATION: ONE XRAY VIEW OF THE CHEST 7/3/2022 8:15 pm COMPARISON: None. HISTORY: Acute left chest pain radiating down the left arm and back. FINDINGS: Cardiomediastinal silhouette and pulmonary vasculature are normal. No consolidation, pleural effusion, or pneumothorax. No acute abnormality. CT CHEST PULMONARY EMBOLISM W CONTRAST    Result Date: 7/3/2022  EXAMINATION: CTA OF THE CHEST 7/3/2022 9:17 pm TECHNIQUE: CTA of the chest was performed after the administration of intravenous contrast.  Multiplanar reformatted images are provided for review. MIP images are provided for review.  Automated exposure control, iterative reconstruction, and/or weight based adjustment of the mA/kV was utilized to reduce the radiation dose to as low as reasonably achievable. COMPARISON: None. HISTORY: ORDERING SYSTEM PROVIDED HISTORY: CP, SOB, tachy, elevated dimer TECHNOLOGIST PROVIDED HISTORY: Reason for exam:->CP, SOB, tachy, elevated dimer Decision Support Exception - unselect if not a suspected or confirmed emergency medical condition->Emergency Medical Condition (MA) Reason for Exam: woke up with chest pain and left arm numbness Relevant Medical/Surgical History: aniety and HX of brain aneurysm FINDINGS: Pulmonary Arteries: Pulmonary arteries are adequately opacified for evaluation. No evidence of intraluminal filling defect to suggest pulmonary embolism. Main pulmonary artery is normal in caliber. Mediastinum: No evidence of mediastinal lymphadenopathy. The heart and pericardium demonstrate no acute abnormality. There is no acute abnormality of the thoracic aorta. Small fat containing hiatal hernia. Small amount of residual thymus in the anterior superior mediastinum. Lungs/pleura: The lungs are without acute process. No focal consolidation or pulmonary edema. No evidence of pleural effusion or pneumothorax. Upper Abdomen: Limited images of the upper abdomen are unremarkable. Soft Tissues/Bones: No acute bone or soft tissue abnormality. No evidence of pulmonary embolism or acute pulmonary abnormality. HEART SCORE:    History: +0 for low suspicion  EKG: +0 for normal EKG   Age: [de-identified] for age <45 years  Risk factors (includes HLD, HTN, DM, tobacco use, obesity, and +FHx): +2 for known CAD or 3+ risk factors  Initial troponin: +0 for negative troponin    Heart score: 2. This falls under the following category: Score of 0-3, which indicates a very low risk for major adverse cardiac event and supports early discharge    Score 0-3 0.9%-1.7% risk of major adverse cardiac event (MACE).  In the HEART Score study, these patients were discharged. ED COURSE/MDM  Patient seen and evaluated. Old records reviewed. Diagnostic testing reviewed and results discussed. I have evaluated this patient. My supervising physician was available for consultation. Zenaida Austin presented to the ED today with above noted complaints. Arrival vital signs: Afebrile, heart rate tachycardic at 132, BP elevated 149/88. Well saturated on room air. Physical exam performed at 2007: No adventitious breath sounds on exam.  No reproducible chest or abdominal tenderness to palpation. Blood work: No evidence of systemic infection. No anemia. No electrolyte abnormality. No evidence of acute kidney injury or transaminitis. Troponin is negative. BNP is within normal limits. D-dimer elevated at 3.39. Lipase is normal.  Magnesium is normal.  EKG: shows sinus tachycardia, no acute findings. Imaging: Chest x-ray is without acute findings. CT of the chest was obtained due to elevated D-dimer and tachycardia with chest pain and shortness of breath and shows no evidence of PE or acute pulmonary abnormality. Medications given in the ED:   Medications   0.9 % sodium chloride bolus (0 mLs IntraVENous Stopped 7/3/22 2137)   iopamidol (ISOVUE-370) 76 % injection 75 mL (75 mLs IntraVENous Given 7/3/22 2128)   ketorolac (TORADOL) injection 30 mg (30 mg IntraVENous Given 7/3/22 2219)      Second troponin was obtained and negative. Patient has a low heart score of 2. Patient was given Toradol and did report that his symptoms resolved after the medication was given. He was initially tachycardic and was given IV fluids. Heart rate did normalize in the ER. Patient reports that he feels much better and is in agreement with discharge.   I did advise him that he does have some risk factors for heart disease and I strongly encouraged him to follow-up with his primary care provider, discuss his chest pain and visit to the ER to see if he has any further recommendations. Patient verbalized his agreement and understanding with this plan. At this point I do not feel the patient requires further work up and it is reasonable to discharge the patient. Please refer to AVS for further details regarding discharge instructions. A discussion was had with the patient regarding diagnosis, diagnostic testing results, treatment/ plan of care, and follow up. All questions were answered. Patient will follow up as directed for further evaluation/treatment. The patient was given strict return precautions as we discussed symptoms that would necessitate return to the ED. Patient will return to ED for new/worsening symptoms. The patient verbalized their understanding and agreement with the above plan. I estimate there is LOW risk for ACUTE CORONARY SYNDROME, INTRACRANIAL HEMORRHAGE, MALIGNANT DYSRHYTHMIA or HYPERTENSION, PULMONARY EMBOLISM, SEPSIS, SUBARACHNOID HEMORRHAGE, SUBDURAL HEMATOMA, STROKE, or THORACIC AORTIC DISSECTION, thus I consider the discharge disposition reasonable. Carla Albright and I have discussed the diagnosis and risks, and we agree with discharging home to follow-up with their primary doctor. We also discussed returning to the Emergency Department immediately if new or worsening symptoms occur. We have discussed the symptoms which are most concerning (e.g., bloody sputum, fever, worsening pain or shortness of breath, vomiting, weakness) that necessitate immediate return. Patient was sent home with a prescription for below medication/s. I did [place] patient on appropriate use of these medication. There are no discharge medications for this patient. CLINICAL IMPRESSION    1. Chest pain, unspecified type    2. Numbness and tingling in left hand           Discharge Vitals:  Blood pressure (!) 143/85, pulse 90, temperature 98.9 °F (37.2 °C), resp. rate 19, height 6' (1.829 m), weight (!) 350 lb (158.8 kg), SpO2 97 %.     FOLLOW UP  Arron Silva MD  [address] [facility] [address]  [address] [facility] [phone]    Call in 2 days  For further evaluation    [facility]  ED  [address] [place]  [address] [phone]  Go to   If symptoms worsen      DISPOSITION  Patient was discharged to home in good condition. Comment: Please note this report has been produced using speech recognition software and may contain errors related to that system including errors in grammar, punctuation, and spelling, as well as words and phrases that may be inappropriate. If there are any questions or concerns please feel free to contact the dictating provider for clarification.        Jimmie Mario, DEMI - CNP  07/04/22 4799

## 2023-01-04 ENCOUNTER — NEW PATIENT COMPREHENSIVE (OUTPATIENT)
Dept: URBAN - METROPOLITAN AREA CLINIC 47 | Facility: CLINIC | Age: 33
End: 2023-01-04

## 2023-01-04 DIAGNOSIS — H52.13: ICD-10-CM

## 2023-01-04 DIAGNOSIS — H52.223: ICD-10-CM

## 2023-01-04 PROCEDURE — 92015 DETERMINE REFRACTIVE STATE: CPT

## 2023-01-04 PROCEDURE — 92004 COMPRE OPH EXAM NEW PT 1/>: CPT

## 2023-01-04 ASSESSMENT — VISUAL ACUITY
OU_CC: 20/20
OD_CC: 20/25-1
OS_CC: 20/20-1

## 2023-01-04 ASSESSMENT — TONOMETRY
OD_IOP_MMHG: 36
OS_IOP_MMHG: 34
OD_IOP_MMHG: 31
OS_IOP_MMHG: 30

## 2023-01-04 ASSESSMENT — KERATOMETRY
OD_AXISANGLE_DEGREES: 35
OS_K1POWER_DIOPTERS: 42.25
OS_AXISANGLE_DEGREES: 135
OD_K1POWER_DIOPTERS: 42.50
OS_AXISANGLE2_DEGREES: 45
OD_K2POWER_DIOPTERS: 42.75
OD_AXISANGLE2_DEGREES: 125
OS_K2POWER_DIOPTERS: 42.75

## 2023-07-23 ENCOUNTER — HOSPITAL ENCOUNTER (EMERGENCY)
Age: 33
Discharge: HOME | End: 2023-07-23
Payer: COMMERCIAL

## 2023-07-23 ENCOUNTER — APPOINTMENT (OUTPATIENT)
Dept: GENERAL RADIOLOGY | Age: 33
End: 2023-07-23
Payer: COMMERCIAL

## 2023-07-23 VITALS
HEIGHT: 72 IN | DIASTOLIC BLOOD PRESSURE: 91 MMHG | BODY MASS INDEX: 42.66 KG/M2 | TEMPERATURE: 97.8 F | WEIGHT: 315 LBS | RESPIRATION RATE: 14 BRPM | OXYGEN SATURATION: 97 % | HEART RATE: 96 BPM | SYSTOLIC BLOOD PRESSURE: 157 MMHG
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
## 2023-07-23 PROCEDURE — 73130 X-RAY EXAM OF HAND: CPT

## 2023-07-23 PROCEDURE — 6370000000 HC RX 637 (ALT 250 FOR IP): Performed by: NURSE PRACTITIONER

## 2023-07-23 PROCEDURE — 99283 EMERGENCY DEPT VISIT LOW MDM: CPT

## 2023-07-23 PROCEDURE — 73070 X-RAY EXAM OF ELBOW: CPT

## 2023-07-23 RX ORDER — AMOXICILLIN AND CLAVULANATE POTASSIUM 875; 125 MG/1; MG/1
1 TABLET, FILM COATED ORAL 2 TIMES DAILY
Qty: 14 TABLET | Refills: 0 | Status: SHIPPED | OUTPATIENT
Start: 2023-07-23 | End: 2023-07-30

## 2023-07-23 RX ORDER — VALSARTAN AND HYDROCHLOROTHIAZIDE 160; 12.5 MG/1; MG/1
1 TABLET, FILM COATED ORAL DAILY
COMMUNITY
Start: 2023-05-18

## 2023-07-23 RX ORDER — AMOXICILLIN AND CLAVULANATE POTASSIUM 875; 125 MG/1; MG/1
1 TABLET, FILM COATED ORAL ONCE
Status: COMPLETED | OUTPATIENT
Start: 2023-07-23 | End: 2023-07-23

## 2023-07-23 RX ADMIN — AMOXICILLIN AND CLAVULANATE POTASSIUM 1 TABLET: 875; 125 TABLET, FILM COATED ORAL at 21:24

## 2023-07-23 ASSESSMENT — PAIN DESCRIPTION - ORIENTATION: ORIENTATION: RIGHT

## 2023-07-23 ASSESSMENT — PAIN SCALES - GENERAL: PAINLEVEL_OUTOF10: 5

## 2023-07-23 ASSESSMENT — PAIN - FUNCTIONAL ASSESSMENT: PAIN_FUNCTIONAL_ASSESSMENT: 0-10

## 2023-07-23 ASSESSMENT — PAIN DESCRIPTION - LOCATION: LOCATION: ELBOW

## 2023-07-23 NOTE — ED PROVIDER NOTES
[address] [place] [address]  ED  EMERGENCY DEPARTMENT ENCOUNTER        Pt Name: Ebonie Reina  MRN: [id number]  [address] [facility] 1990  Date of evaluation: 7/23/2023  Provider: DEMI Ware - CNP  PCP: Asa Suarez MD  Note Started: 7:56 PM EDT 7/23/23    Evaluated by LINDSAY. I have evaluated this patient. My supervising physician was available for consultation. CHIEF COMPLAINT       Chief Complaint   Patient presents with    Animal Bite     Pt to ED with CC of dog bite to R elbow approximately 1 hour prior to arrival.  Pt thinks that the dog's shots are up to date. States tetanus was within last 10 years     HISTORY OF PRESENT ILLNESS: 1 or more Elements     History From: Patient  Limitations to history : None    Ebonie Reina is a 34 y.o. male who presents to ER with dog bites. He is dog sitting, neighbor let out their dogs, he went to get the dog he is dog sitting and the dog turned around and bit him. Dog is up to date on vaccines. Patient's tetanus is up to date. Right elbow is stiff, couple bites on the left hand, trouble bending the fingers. Denies numbness or tingling into either hand. Right hand dominant. Works at [employer]. Nursing Notes were all reviewed and agreed with or any disagreements were addressed in the HPI. REVIEW OF SYSTEMS :      Review of Systems    Positives and Pertinent negatives as per HPI. SURGICAL HISTORY   History reviewed. No pertinent surgical history. [facility]       Discharge Medication List as of 7/23/2023  9:36 PM        CONTINUE these medications which have NOT CHANGED    Details   valsartan-hydroCHLOROthiazide (DIOVAN-HCT) 160-12.5 MG per tablet Take 1 tablet by mouth dailyHistorical Med             ALLERGIES     Patient has no known allergies. FAMILYHISTORY     History reviewed. No pertinent family history.      SOCIAL HISTORY       Social History     Tobacco Use    Smoking status: Former    Smokeless tobacco: Never   Vaping Use

## 2023-07-24 NOTE — ED NOTES
Pt right elbow/left fingers dog bite injuries cleansed with Hibiclens/Normal Saline. Pt tolerated well.      Pola Tobar LPN  08/82/54 2646

## 2023-07-24 NOTE — ED NOTES
Pt right elbow/left fingers applied with gauze/kerlex dressing/coban applied over gauze to fingers to keep clean. Pt instructed on at home care of wounds care.      Karissa Signs, LPN  40/41/76 [id number]

## 2023-07-24 NOTE — ED NOTES
Pt scripts x1 instructed to follow up with PCP for wound recheck. Assessed per Arthuro Labrum NP.      Karissa Signs, LPN  71/20/92 [id number]

## 2024-10-08 NOTE — PROGRESS NOTES
Balance  Sitting Balance: Independent  Standing Balance: Independent  Standing Balance  Sit to stand: Supervision  Stand to sit: Independent    Functional Mobility  Functional - Mobility Device: No device  Activity: Other  Assist Level: Supervision     ADL  Feeding: Independent  UE Dressing: Independent  LE Dressing: Independent  Toileting: Independent     Tone RUE  RUE Tone: Normotonic  Tone LUE  LUE Tone: Normotonic  Coordination  Movements Are Fluid And Coordinated: Yes        Transfers  Sit to stand: Supervision  Stand to sit: Independent     Cognition  Overall Cognitive Status: WFL        Sensation  Overall Sensation Status: WNL        LUE AROM (degrees)  LUE AROM : WFL  RUE AROM (degrees)  RUE AROM : WFL  LUE Strength  Gross LUE Strength: WFL  LUE Strength Comment: slight decreased strength with shoulder flexion  RUE Strength  Gross RUE Strength: WFL     Assessment   Assessment: Patient admitted with L side weakness, slight L shoulder flexion weakness noted otherwise LUE is WFL, no sensation deficits noted.    Prognosis: Good  Decision Making: Low Complexity  REQUIRES OT FOLLOW UP: No  Safety Devices  Safety Devices in place: Yes  Type of devices: Left in chair;Nurse notified;Call light within reach;Gait belt         Plan   Plan  Times per week: 1x only    G-Code  OT G-codes  Functional Assessment Tool Used: -PAC  Score: 24  Functional Limitation: Self care  Self Care Current Status (): 0 percent impaired, limited or restricted  Self Care Goal Status (): 0 percent impaired, limited or restricted  Self Care Discharge Status (): 0 percent impaired, limited or restricted    AM-PAC Score        AM-[facility] Inpatient Daily Activity Raw Score: 24  AM-PAC Inpatient ADL T-Scale Score : 57.54  ADL Inpatient CMS 0-100% Score: 0  ADL Inpatient CMS G-Code Modifier : CH    Goals  Short term goals  Time Frame for Short term goals: 1 sessopm  Short term goal 1: Pt. will complete LE dressing with Negative